# Patient Record
Sex: FEMALE | Race: WHITE | NOT HISPANIC OR LATINO | Employment: FULL TIME | URBAN - METROPOLITAN AREA
[De-identification: names, ages, dates, MRNs, and addresses within clinical notes are randomized per-mention and may not be internally consistent; named-entity substitution may affect disease eponyms.]

---

## 2019-07-16 ENCOUNTER — HOSPITAL ENCOUNTER (OUTPATIENT)
Facility: MEDICAL CENTER | Age: 39
End: 2019-07-17
Attending: EMERGENCY MEDICINE | Admitting: SURGERY
Payer: COMMERCIAL

## 2019-07-16 ENCOUNTER — APPOINTMENT (OUTPATIENT)
Dept: RADIOLOGY | Facility: MEDICAL CENTER | Age: 39
End: 2019-07-16
Attending: EMERGENCY MEDICINE
Payer: COMMERCIAL

## 2019-07-16 ENCOUNTER — ANESTHESIA (OUTPATIENT)
Dept: SURGERY | Facility: MEDICAL CENTER | Age: 39
End: 2019-07-16
Payer: COMMERCIAL

## 2019-07-16 ENCOUNTER — ANESTHESIA EVENT (OUTPATIENT)
Dept: SURGERY | Facility: MEDICAL CENTER | Age: 39
End: 2019-07-16
Payer: COMMERCIAL

## 2019-07-16 DIAGNOSIS — G89.18 POSTOPERATIVE PAIN: ICD-10-CM

## 2019-07-16 DIAGNOSIS — K80.00 ACUTE CHOLECYSTITIS DUE TO BILIARY CALCULUS: ICD-10-CM

## 2019-07-16 LAB
ALBUMIN SERPL BCP-MCNC: 4.1 G/DL (ref 3.2–4.9)
ALBUMIN/GLOB SERPL: 1.4 G/DL
ALP SERPL-CCNC: 72 U/L (ref 30–99)
ALT SERPL-CCNC: 9 U/L (ref 2–50)
ANION GAP SERPL CALC-SCNC: 8 MMOL/L (ref 0–11.9)
ANISOCYTOSIS BLD QL SMEAR: ABNORMAL
APPEARANCE UR: ABNORMAL
AST SERPL-CCNC: 10 U/L (ref 12–45)
BACTERIA #/AREA URNS HPF: ABNORMAL /HPF
BASOPHILS # BLD AUTO: 0.6 % (ref 0–1.8)
BASOPHILS # BLD: 0.05 K/UL (ref 0–0.12)
BILIRUB SERPL-MCNC: 0.4 MG/DL (ref 0.1–1.5)
BILIRUB UR QL STRIP.AUTO: NEGATIVE
BUN SERPL-MCNC: 8 MG/DL (ref 8–22)
CALCIUM SERPL-MCNC: 9 MG/DL (ref 8.5–10.5)
CHLORIDE SERPL-SCNC: 108 MMOL/L (ref 96–112)
CO2 SERPL-SCNC: 22 MMOL/L (ref 20–33)
COLOR UR: YELLOW
COMMENT 1642: NORMAL
CREAT SERPL-MCNC: 0.66 MG/DL (ref 0.5–1.4)
DACRYOCYTES BLD QL SMEAR: NORMAL
EKG IMPRESSION: NORMAL
EOSINOPHIL # BLD AUTO: 0.11 K/UL (ref 0–0.51)
EOSINOPHIL NFR BLD: 1.3 % (ref 0–6.9)
EPI CELLS #/AREA URNS HPF: ABNORMAL /HPF
ERYTHROCYTE [DISTWIDTH] IN BLOOD BY AUTOMATED COUNT: 43.3 FL (ref 35.9–50)
GLOBULIN SER CALC-MCNC: 3 G/DL (ref 1.9–3.5)
GLUCOSE SERPL-MCNC: 108 MG/DL (ref 65–99)
GLUCOSE UR STRIP.AUTO-MCNC: NEGATIVE MG/DL
HCG SERPL QL: NEGATIVE
HCT VFR BLD AUTO: 31.8 % (ref 37–47)
HGB BLD-MCNC: 8.6 G/DL (ref 12–16)
HYALINE CASTS #/AREA URNS LPF: ABNORMAL /LPF
IMM GRANULOCYTES # BLD AUTO: 0.02 K/UL (ref 0–0.11)
IMM GRANULOCYTES NFR BLD AUTO: 0.2 % (ref 0–0.9)
KETONES UR STRIP.AUTO-MCNC: NEGATIVE MG/DL
LEUKOCYTE ESTERASE UR QL STRIP.AUTO: ABNORMAL
LIPASE SERPL-CCNC: 7 U/L (ref 11–82)
LYMPHOCYTES # BLD AUTO: 1.88 K/UL (ref 1–4.8)
LYMPHOCYTES NFR BLD: 21.6 % (ref 22–41)
MCH RBC QN AUTO: 16.9 PG (ref 27–33)
MCHC RBC AUTO-ENTMCNC: 27 G/DL (ref 33.6–35)
MCV RBC AUTO: 62.5 FL (ref 81.4–97.8)
MICRO URNS: ABNORMAL
MICROCYTES BLD QL SMEAR: ABNORMAL
MONOCYTES # BLD AUTO: 0.37 K/UL (ref 0–0.85)
MONOCYTES NFR BLD AUTO: 4.3 % (ref 0–13.4)
MORPHOLOGY BLD-IMP: NORMAL
NEUTROPHILS # BLD AUTO: 6.27 K/UL (ref 2–7.15)
NEUTROPHILS NFR BLD: 72 % (ref 44–72)
NITRITE UR QL STRIP.AUTO: NEGATIVE
NRBC # BLD AUTO: 0 K/UL
NRBC BLD-RTO: 0 /100 WBC
OVALOCYTES BLD QL SMEAR: NORMAL
PATHOLOGY CONSULT NOTE: NORMAL
PH UR STRIP.AUTO: 6.5 [PH]
PLATELET # BLD AUTO: 228 K/UL (ref 164–446)
PLATELET BLD QL SMEAR: NORMAL
POIKILOCYTOSIS BLD QL SMEAR: NORMAL
POLYCHROMASIA BLD QL SMEAR: NORMAL
POTASSIUM SERPL-SCNC: 3.9 MMOL/L (ref 3.6–5.5)
PROT SERPL-MCNC: 7.1 G/DL (ref 6–8.2)
PROT UR QL STRIP: NEGATIVE MG/DL
RBC # BLD AUTO: 5.09 M/UL (ref 4.2–5.4)
RBC # URNS HPF: ABNORMAL /HPF
RBC BLD AUTO: PRESENT
RBC UR QL AUTO: NEGATIVE
SODIUM SERPL-SCNC: 138 MMOL/L (ref 135–145)
SP GR UR STRIP.AUTO: 1.02
UROBILINOGEN UR STRIP.AUTO-MCNC: 0.2 MG/DL
WBC # BLD AUTO: 8.7 K/UL (ref 4.8–10.8)
WBC #/AREA URNS HPF: ABNORMAL /HPF

## 2019-07-16 PROCEDURE — A9270 NON-COVERED ITEM OR SERVICE: HCPCS | Performed by: SURGERY

## 2019-07-16 PROCEDURE — 160036 HCHG PACU - EA ADDL 30 MINS PHASE I: Performed by: SURGERY

## 2019-07-16 PROCEDURE — 160047 HCHG PACU  - EA ADDL 30 MINS PHASE II: Performed by: SURGERY

## 2019-07-16 PROCEDURE — 502571 HCHG PACK, LAP CHOLE: Performed by: SURGERY

## 2019-07-16 PROCEDURE — 501399 HCHG SPECIMAN BAG, ENDO CATC: Performed by: SURGERY

## 2019-07-16 PROCEDURE — 501838 HCHG SUTURE GENERAL: Performed by: SURGERY

## 2019-07-16 PROCEDURE — 700111 HCHG RX REV CODE 636 W/ 250 OVERRIDE (IP): Performed by: SURGERY

## 2019-07-16 PROCEDURE — 501572 HCHG TROCAR, SHIELD OBTU 5X100: Performed by: SURGERY

## 2019-07-16 PROCEDURE — 85025 COMPLETE CBC W/AUTO DIFF WBC: CPT

## 2019-07-16 PROCEDURE — 160025 RECOVERY II MINUTES (STATS): Performed by: SURGERY

## 2019-07-16 PROCEDURE — 99291 CRITICAL CARE FIRST HOUR: CPT

## 2019-07-16 PROCEDURE — 501584 HCHG TROCAR, THRD CAN&SEAL11X100: Performed by: SURGERY

## 2019-07-16 PROCEDURE — 88304 TISSUE EXAM BY PATHOLOGIST: CPT

## 2019-07-16 PROCEDURE — 160028 HCHG SURGERY MINUTES - 1ST 30 MINS LEVEL 3: Performed by: SURGERY

## 2019-07-16 PROCEDURE — 160046 HCHG PACU - 1ST 60 MINS PHASE II: Performed by: SURGERY

## 2019-07-16 PROCEDURE — 700102 HCHG RX REV CODE 250 W/ 637 OVERRIDE(OP): Performed by: SURGERY

## 2019-07-16 PROCEDURE — 80053 COMPREHEN METABOLIC PANEL: CPT

## 2019-07-16 PROCEDURE — 700111 HCHG RX REV CODE 636 W/ 250 OVERRIDE (IP): Performed by: ANESTHESIOLOGY

## 2019-07-16 PROCEDURE — 83690 ASSAY OF LIPASE: CPT

## 2019-07-16 PROCEDURE — 700101 HCHG RX REV CODE 250: Performed by: SURGERY

## 2019-07-16 PROCEDURE — 700105 HCHG RX REV CODE 258: Performed by: SURGERY

## 2019-07-16 PROCEDURE — 81001 URINALYSIS AUTO W/SCOPE: CPT

## 2019-07-16 PROCEDURE — 501583 HCHG TROCAR, THRD CAN&SEAL 5X100: Performed by: SURGERY

## 2019-07-16 PROCEDURE — 160002 HCHG RECOVERY MINUTES (STAT): Performed by: SURGERY

## 2019-07-16 PROCEDURE — 700105 HCHG RX REV CODE 258: Performed by: EMERGENCY MEDICINE

## 2019-07-16 PROCEDURE — A9270 NON-COVERED ITEM OR SERVICE: HCPCS | Performed by: EMERGENCY MEDICINE

## 2019-07-16 PROCEDURE — 500868 HCHG NEEDLE, SURGI(VARES): Performed by: SURGERY

## 2019-07-16 PROCEDURE — 501582 HCHG TROCAR, THRD BLADED: Performed by: SURGERY

## 2019-07-16 PROCEDURE — 36415 COLL VENOUS BLD VENIPUNCTURE: CPT

## 2019-07-16 PROCEDURE — 96376 TX/PRO/DX INJ SAME DRUG ADON: CPT

## 2019-07-16 PROCEDURE — 84703 CHORIONIC GONADOTROPIN ASSAY: CPT

## 2019-07-16 PROCEDURE — 160039 HCHG SURGERY MINUTES - EA ADDL 1 MIN LEVEL 3: Performed by: SURGERY

## 2019-07-16 PROCEDURE — 160035 HCHG PACU - 1ST 60 MINS PHASE I: Performed by: SURGERY

## 2019-07-16 PROCEDURE — 96365 THER/PROPH/DIAG IV INF INIT: CPT

## 2019-07-16 PROCEDURE — 76705 ECHO EXAM OF ABDOMEN: CPT

## 2019-07-16 PROCEDURE — 700105 HCHG RX REV CODE 258: Performed by: ANESTHESIOLOGY

## 2019-07-16 PROCEDURE — A9270 NON-COVERED ITEM OR SERVICE: HCPCS | Performed by: ANESTHESIOLOGY

## 2019-07-16 PROCEDURE — 700111 HCHG RX REV CODE 636 W/ 250 OVERRIDE (IP): Performed by: EMERGENCY MEDICINE

## 2019-07-16 PROCEDURE — 700102 HCHG RX REV CODE 250 W/ 637 OVERRIDE(OP): Performed by: ANESTHESIOLOGY

## 2019-07-16 PROCEDURE — G0378 HOSPITAL OBSERVATION PER HR: HCPCS

## 2019-07-16 PROCEDURE — 96374 THER/PROPH/DIAG INJ IV PUSH: CPT

## 2019-07-16 PROCEDURE — 87086 URINE CULTURE/COLONY COUNT: CPT

## 2019-07-16 PROCEDURE — 96375 TX/PRO/DX INJ NEW DRUG ADDON: CPT

## 2019-07-16 PROCEDURE — 160048 HCHG OR STATISTICAL LEVEL 1-5: Performed by: SURGERY

## 2019-07-16 PROCEDURE — 93005 ELECTROCARDIOGRAM TRACING: CPT | Performed by: EMERGENCY MEDICINE

## 2019-07-16 PROCEDURE — 700102 HCHG RX REV CODE 250 W/ 637 OVERRIDE(OP): Performed by: EMERGENCY MEDICINE

## 2019-07-16 PROCEDURE — 502240 HCHG MISC OR SUPPLY RC 0272: Performed by: SURGERY

## 2019-07-16 PROCEDURE — 160009 HCHG ANES TIME/MIN: Performed by: SURGERY

## 2019-07-16 PROCEDURE — 700101 HCHG RX REV CODE 250: Performed by: ANESTHESIOLOGY

## 2019-07-16 PROCEDURE — A6402 STERILE GAUZE <= 16 SQ IN: HCPCS | Performed by: SURGERY

## 2019-07-16 PROCEDURE — 93005 ELECTROCARDIOGRAM TRACING: CPT

## 2019-07-16 RX ORDER — SODIUM CHLORIDE, SODIUM LACTATE, POTASSIUM CHLORIDE, CALCIUM CHLORIDE 600; 310; 30; 20 MG/100ML; MG/100ML; MG/100ML; MG/100ML
INJECTION, SOLUTION INTRAVENOUS
Status: DISCONTINUED | OUTPATIENT
Start: 2019-07-16 | End: 2019-07-16 | Stop reason: SURG

## 2019-07-16 RX ORDER — HYDROMORPHONE HYDROCHLORIDE 1 MG/ML
0.2 INJECTION, SOLUTION INTRAMUSCULAR; INTRAVENOUS; SUBCUTANEOUS
Status: DISCONTINUED | OUTPATIENT
Start: 2019-07-16 | End: 2019-07-16 | Stop reason: HOSPADM

## 2019-07-16 RX ORDER — OXYCODONE HYDROCHLORIDE AND ACETAMINOPHEN 5; 325 MG/1; MG/1
1 TABLET ORAL
Status: COMPLETED | OUTPATIENT
Start: 2019-07-16 | End: 2019-07-16

## 2019-07-16 RX ORDER — ONDANSETRON 2 MG/ML
4 INJECTION INTRAMUSCULAR; INTRAVENOUS ONCE
Status: COMPLETED | OUTPATIENT
Start: 2019-07-16 | End: 2019-07-16

## 2019-07-16 RX ORDER — HYDROMORPHONE HYDROCHLORIDE 1 MG/ML
0.1 INJECTION, SOLUTION INTRAMUSCULAR; INTRAVENOUS; SUBCUTANEOUS
Status: DISCONTINUED | OUTPATIENT
Start: 2019-07-16 | End: 2019-07-16 | Stop reason: HOSPADM

## 2019-07-16 RX ORDER — MEPERIDINE HYDROCHLORIDE 25 MG/ML
12.5 INJECTION INTRAMUSCULAR; INTRAVENOUS; SUBCUTANEOUS
Status: DISCONTINUED | OUTPATIENT
Start: 2019-07-16 | End: 2019-07-16 | Stop reason: HOSPADM

## 2019-07-16 RX ORDER — LABETALOL HYDROCHLORIDE 5 MG/ML
5 INJECTION, SOLUTION INTRAVENOUS
Status: DISCONTINUED | OUTPATIENT
Start: 2019-07-16 | End: 2019-07-16 | Stop reason: HOSPADM

## 2019-07-16 RX ORDER — HYDRALAZINE HYDROCHLORIDE 20 MG/ML
5 INJECTION INTRAMUSCULAR; INTRAVENOUS
Status: DISCONTINUED | OUTPATIENT
Start: 2019-07-16 | End: 2019-07-16 | Stop reason: HOSPADM

## 2019-07-16 RX ORDER — MORPHINE SULFATE 4 MG/ML
4 INJECTION, SOLUTION INTRAMUSCULAR; INTRAVENOUS ONCE
Status: COMPLETED | OUTPATIENT
Start: 2019-07-16 | End: 2019-07-16

## 2019-07-16 RX ORDER — DIPHENHYDRAMINE HYDROCHLORIDE 50 MG/ML
25 INJECTION INTRAMUSCULAR; INTRAVENOUS EVERY 6 HOURS PRN
Status: DISCONTINUED | OUTPATIENT
Start: 2019-07-16 | End: 2019-07-17 | Stop reason: HOSPADM

## 2019-07-16 RX ORDER — SODIUM CHLORIDE 9 MG/ML
1000 INJECTION, SOLUTION INTRAVENOUS ONCE
Status: COMPLETED | OUTPATIENT
Start: 2019-07-16 | End: 2019-07-16

## 2019-07-16 RX ORDER — SODIUM CHLORIDE, SODIUM LACTATE, POTASSIUM CHLORIDE, CALCIUM CHLORIDE 600; 310; 30; 20 MG/100ML; MG/100ML; MG/100ML; MG/100ML
INJECTION, SOLUTION INTRAVENOUS
Status: COMPLETED | OUTPATIENT
Start: 2019-07-16 | End: 2019-07-16

## 2019-07-16 RX ORDER — SODIUM CHLORIDE, SODIUM LACTATE, POTASSIUM CHLORIDE, CALCIUM CHLORIDE 600; 310; 30; 20 MG/100ML; MG/100ML; MG/100ML; MG/100ML
INJECTION, SOLUTION INTRAVENOUS CONTINUOUS
Status: DISCONTINUED | OUTPATIENT
Start: 2019-07-16 | End: 2019-07-16 | Stop reason: HOSPADM

## 2019-07-16 RX ORDER — SCOLOPAMINE TRANSDERMAL SYSTEM 1 MG/1
1 PATCH, EXTENDED RELEASE TRANSDERMAL
Status: DISCONTINUED | OUTPATIENT
Start: 2019-07-16 | End: 2019-07-17 | Stop reason: HOSPADM

## 2019-07-16 RX ORDER — HYDROMORPHONE HYDROCHLORIDE 1 MG/ML
0.4 INJECTION, SOLUTION INTRAMUSCULAR; INTRAVENOUS; SUBCUTANEOUS
Status: DISCONTINUED | OUTPATIENT
Start: 2019-07-16 | End: 2019-07-16 | Stop reason: HOSPADM

## 2019-07-16 RX ORDER — MORPHINE SULFATE 4 MG/ML
1-5 INJECTION, SOLUTION INTRAMUSCULAR; INTRAVENOUS
Status: DISCONTINUED | OUTPATIENT
Start: 2019-07-16 | End: 2019-07-17 | Stop reason: HOSPADM

## 2019-07-16 RX ORDER — ONDANSETRON 2 MG/ML
4 INJECTION INTRAMUSCULAR; INTRAVENOUS EVERY 4 HOURS PRN
Status: DISCONTINUED | OUTPATIENT
Start: 2019-07-16 | End: 2019-07-17 | Stop reason: HOSPADM

## 2019-07-16 RX ORDER — METOPROLOL TARTRATE 1 MG/ML
1 INJECTION, SOLUTION INTRAVENOUS
Status: DISCONTINUED | OUTPATIENT
Start: 2019-07-16 | End: 2019-07-16 | Stop reason: HOSPADM

## 2019-07-16 RX ORDER — HALOPERIDOL 5 MG/ML
1 INJECTION INTRAMUSCULAR
Status: DISCONTINUED | OUTPATIENT
Start: 2019-07-16 | End: 2019-07-16 | Stop reason: HOSPADM

## 2019-07-16 RX ORDER — ONDANSETRON 2 MG/ML
4 INJECTION INTRAMUSCULAR; INTRAVENOUS
Status: DISCONTINUED | OUTPATIENT
Start: 2019-07-16 | End: 2019-07-16 | Stop reason: HOSPADM

## 2019-07-16 RX ORDER — SODIUM CHLORIDE AND POTASSIUM CHLORIDE 150; 450 MG/100ML; MG/100ML
INJECTION, SOLUTION INTRAVENOUS CONTINUOUS
Status: DISCONTINUED | OUTPATIENT
Start: 2019-07-16 | End: 2019-07-17 | Stop reason: HOSPADM

## 2019-07-16 RX ORDER — CIPROFLOXACIN 2 MG/ML
INJECTION, SOLUTION INTRAVENOUS PRN
Status: DISCONTINUED | OUTPATIENT
Start: 2019-07-16 | End: 2019-07-16 | Stop reason: SURG

## 2019-07-16 RX ORDER — OXYCODONE AND ACETAMINOPHEN 10; 325 MG/1; MG/1
1 TABLET ORAL EVERY 6 HOURS PRN
Status: DISCONTINUED | OUTPATIENT
Start: 2019-07-16 | End: 2019-07-17 | Stop reason: HOSPADM

## 2019-07-16 RX ORDER — DIPHENHYDRAMINE HYDROCHLORIDE 50 MG/ML
12.5 INJECTION INTRAMUSCULAR; INTRAVENOUS
Status: DISCONTINUED | OUTPATIENT
Start: 2019-07-16 | End: 2019-07-16 | Stop reason: HOSPADM

## 2019-07-16 RX ORDER — OXYCODONE AND ACETAMINOPHEN 7.5; 325 MG/1; MG/1
1 TABLET ORAL EVERY 6 HOURS PRN
Qty: 24 TAB | Refills: 0 | Status: SHIPPED | OUTPATIENT
Start: 2019-07-16 | End: 2019-07-23

## 2019-07-16 RX ORDER — OXYCODONE HYDROCHLORIDE AND ACETAMINOPHEN 5; 325 MG/1; MG/1
2 TABLET ORAL
Status: COMPLETED | OUTPATIENT
Start: 2019-07-16 | End: 2019-07-16

## 2019-07-16 RX ORDER — CIPROFLOXACIN 2 MG/ML
400 INJECTION, SOLUTION INTRAVENOUS EVERY 12 HOURS
Status: DISCONTINUED | OUTPATIENT
Start: 2019-07-16 | End: 2019-07-17 | Stop reason: HOSPADM

## 2019-07-16 RX ORDER — ONDANSETRON 2 MG/ML
INJECTION INTRAMUSCULAR; INTRAVENOUS PRN
Status: DISCONTINUED | OUTPATIENT
Start: 2019-07-16 | End: 2019-07-16 | Stop reason: SURG

## 2019-07-16 RX ORDER — MIDAZOLAM HYDROCHLORIDE 1 MG/ML
1 INJECTION INTRAMUSCULAR; INTRAVENOUS
Status: DISCONTINUED | OUTPATIENT
Start: 2019-07-16 | End: 2019-07-16 | Stop reason: HOSPADM

## 2019-07-16 RX ORDER — BUPIVACAINE HYDROCHLORIDE AND EPINEPHRINE 5; 5 MG/ML; UG/ML
INJECTION, SOLUTION EPIDURAL; INTRACAUDAL; PERINEURAL
Status: DISCONTINUED | OUTPATIENT
Start: 2019-07-16 | End: 2019-07-16 | Stop reason: HOSPADM

## 2019-07-16 RX ORDER — ONDANSETRON 4 MG/1
4 TABLET, FILM COATED ORAL EVERY 6 HOURS PRN
Qty: 10 TAB | Refills: 0 | Status: SHIPPED | OUTPATIENT
Start: 2019-07-16 | End: 2019-07-23

## 2019-07-16 RX ADMIN — ONDANSETRON 4 MG: 2 INJECTION INTRAMUSCULAR; INTRAVENOUS at 13:16

## 2019-07-16 RX ADMIN — ONDANSETRON 4 MG: 2 INJECTION INTRAMUSCULAR; INTRAVENOUS at 11:32

## 2019-07-16 RX ADMIN — HALOPERIDOL LACTATE 1 MG: 5 INJECTION, SOLUTION INTRAMUSCULAR at 14:59

## 2019-07-16 RX ADMIN — CIPROFLOXACIN 400 MG: 2 INJECTION, SOLUTION INTRAVENOUS at 13:16

## 2019-07-16 RX ADMIN — MIDAZOLAM HYDROCHLORIDE 2 MG: 1 INJECTION, SOLUTION INTRAMUSCULAR; INTRAVENOUS at 13:16

## 2019-07-16 RX ADMIN — HYDROMORPHONE HYDROCHLORIDE 0.4 MG: 1 INJECTION, SOLUTION INTRAMUSCULAR; INTRAVENOUS; SUBCUTANEOUS at 15:06

## 2019-07-16 RX ADMIN — OXYCODONE HYDROCHLORIDE AND ACETAMINOPHEN 1 TABLET: 10; 325 TABLET ORAL at 21:12

## 2019-07-16 RX ADMIN — PROPOFOL 200 MG: 10 INJECTION, EMULSION INTRAVENOUS at 13:16

## 2019-07-16 RX ADMIN — SUCCINYLCHOLINE CHLORIDE 100 MG: 20 INJECTION, SOLUTION INTRAMUSCULAR; INTRAVENOUS at 13:16

## 2019-07-16 RX ADMIN — HYDROMORPHONE HYDROCHLORIDE 0.4 MG: 1 INJECTION, SOLUTION INTRAMUSCULAR; INTRAVENOUS; SUBCUTANEOUS at 16:54

## 2019-07-16 RX ADMIN — CIPROFLOXACIN 400 MG: 2 INJECTION, SOLUTION INTRAVENOUS at 21:13

## 2019-07-16 RX ADMIN — POTASSIUM CHLORIDE AND SODIUM CHLORIDE: 450; 150 INJECTION, SOLUTION INTRAVENOUS at 21:13

## 2019-07-16 RX ADMIN — FENTANYL CITRATE 50 MCG: 0.05 INJECTION, SOLUTION INTRAMUSCULAR; INTRAVENOUS at 14:57

## 2019-07-16 RX ADMIN — SODIUM CHLORIDE, POTASSIUM CHLORIDE, SODIUM LACTATE AND CALCIUM CHLORIDE: 600; 310; 30; 20 INJECTION, SOLUTION INTRAVENOUS at 13:16

## 2019-07-16 RX ADMIN — MIDAZOLAM HYDROCHLORIDE 1 MG: 1 INJECTION, SOLUTION INTRAMUSCULAR; INTRAVENOUS at 15:27

## 2019-07-16 RX ADMIN — SODIUM CHLORIDE, POTASSIUM CHLORIDE, SODIUM LACTATE AND CALCIUM CHLORIDE: 600; 310; 30; 20 INJECTION, SOLUTION INTRAVENOUS at 15:29

## 2019-07-16 RX ADMIN — FAMOTIDINE 20 MG: 10 INJECTION INTRAVENOUS at 10:07

## 2019-07-16 RX ADMIN — FENTANYL CITRATE 100 MCG: 50 INJECTION, SOLUTION INTRAMUSCULAR; INTRAVENOUS at 13:16

## 2019-07-16 RX ADMIN — LIDOCAINE HYDROCHLORIDE 50 MG: 20 INJECTION, SOLUTION INTRAVENOUS at 13:16

## 2019-07-16 RX ADMIN — ROCURONIUM BROMIDE 50 MG: 10 INJECTION, SOLUTION INTRAVENOUS at 13:16

## 2019-07-16 RX ADMIN — HYDROMORPHONE HYDROCHLORIDE 0.4 MG: 1 INJECTION, SOLUTION INTRAMUSCULAR; INTRAVENOUS; SUBCUTANEOUS at 15:17

## 2019-07-16 RX ADMIN — SODIUM CHLORIDE 1000 ML: 9 INJECTION, SOLUTION INTRAVENOUS at 11:33

## 2019-07-16 RX ADMIN — OXYCODONE HYDROCHLORIDE AND ACETAMINOPHEN 2 TABLET: 5; 325 TABLET ORAL at 14:55

## 2019-07-16 RX ADMIN — HYDROMORPHONE HYDROCHLORIDE 0.4 MG: 1 INJECTION, SOLUTION INTRAMUSCULAR; INTRAVENOUS; SUBCUTANEOUS at 19:25

## 2019-07-16 RX ADMIN — MORPHINE SULFATE 4 MG: 4 INJECTION INTRAVENOUS at 23:44

## 2019-07-16 RX ADMIN — MORPHINE SULFATE 4 MG: 4 INJECTION INTRAVENOUS at 11:31

## 2019-07-16 RX ADMIN — LIDOCAINE HYDROCHLORIDE 30 ML: 20 SOLUTION OROPHARYNGEAL at 10:07

## 2019-07-16 ASSESSMENT — LIFESTYLE VARIABLES
DO YOU DRINK ALCOHOL: NO
EVER_SMOKED: YES
ALCOHOL_USE: NO

## 2019-07-16 ASSESSMENT — PATIENT HEALTH QUESTIONNAIRE - PHQ9
SUM OF ALL RESPONSES TO PHQ9 QUESTIONS 1 AND 2: 0
2. FEELING DOWN, DEPRESSED, IRRITABLE, OR HOPELESS: NOT AT ALL
1. LITTLE INTEREST OR PLEASURE IN DOING THINGS: NOT AT ALL

## 2019-07-16 ASSESSMENT — ENCOUNTER SYMPTOMS
CARDIOVASCULAR NEGATIVE: 1
ABDOMINAL PAIN: 1
NAUSEA: 1
RESPIRATORY NEGATIVE: 1
VOMITING: 0

## 2019-07-16 ASSESSMENT — PAIN SCALES - GENERAL: PAIN_LEVEL: 3

## 2019-07-16 NOTE — H&P
DATE OF ADMISSION:  07/16/2019    HISTORY OF PRESENT ILLNESS:  The patient is a 39-year-old female who last week   began having epigastric and right upper quadrant pain with associated nausea,   but no vomiting.  This spontaneously resolved for about 12-15 hours and then   started again over the weekend.  Again, she tufted out and resolved except   last night, again started around 2 o'clock in the morning and became more   severe.  She is having nausea, bloating, no vomiting.  She came to the   emergency room where she was evaluated and found to have a thickened   gallbladder wall with stones and sludge and some pericholecystic fluid.  Her   white count and liver function tests were normal.  I was asked to see the   patient due to the ultrasound findings.  Patient has had several weeks of   intermittent less severe pain which she considered indigestion.    ALLERGIES:  SHE IS ALLERGIC TO NSAIDS, PENICILLIN, SOMA, AND ZOSYN.    MEDICATIONS ON ADMISSION:  None.    PAST MEDICAL HISTORY:  1.  Multiple surgeries on her back with infection for spinal stenosis.  2.  History of deep venous thrombosis in upper arm from PICC line.  3.  Hypertension.  4.  Obesity, morbid.    SOCIAL HISTORY:  Smokes about 6 cigarettes a day.  Drinks alcohol once or   twice a week.  No drug use.    REVIEW OF SYSTEMS:  The patient denies any neurological problems.  No   cardiopulmonary problems currently.  GASTROINTESTINAL:  Nausea without   vomiting for the past 3-4 days with severe right upper quadrant pain.  No   urinary tract symptoms.    FAMILY HISTORY:  Noncontributory.    PHYSICAL EXAMINATION:  GENERAL:  Patient is a morbidly obese female with a BMI of 47.37.  VITAL SIGNS:  Temperature 36.3, heart rate of 68, respirations 16, blood   pressure 134/83.  HEAD AND NECK:  Pupils equal, round, reactive to light.  Extraocular movements   are intact.  No scleral icterus.  No cervical adenopathy.  LUNGS:  Clear bilaterally without wheezes, rales or  rhonchi.  Normal chest   wall expansion.  HEART:  Regular rate and rhythm without murmurs, rubs or gallops.  No carotid   bruits.  No jugular venous distention.  No peripheral edema.  ABDOMEN:  Soft, but tender, especially in the epigastric and right upper   quadrant.  No palpable masses.  Positive Galeano's sign, hypoactive, but   present bowel sounds.  MUSCULOSKELETAL:  Moves all 4 extremities in normal range of motion.  Strength   is grossly normal.  NEUROLOGICAL:  Cranial nerves II-XII are grossly intact.  Sensation is grossly   normal.  PSYCHIATRIC:  Alert and oriented x3.  Mood and affect are appropriate.    LABORATORY DATA:  Show a white count of 8.7, hemoglobin and hematocrit of 8.6   and 37.8 with 288,000 platelets, no left shift.  Lipase of 7, sodium 138,   potassium 3.9, chloride 108, bicarbonate 22, glucose 108, BUN of 8, creatinine   0.66, AST of 10, ALT of 9, alkaline phosphatase is 72, total bilirubin 0.4.    Urine is cloudy with a positive leukocyte esterase and many bacteria.    IMPRESSION:  1.  Acute cholecystitis.  2.  Morbid obesity.  3.  Anemia.  4.  Possible urinary tract infection versus contamination on collection.    PLAN:  Patient is scheduled to be taken to the operating room for a   laparoscopic cholecystectomy.  I have explained the risks and benefits to her   including bleeding, infection, injury to the common bile duct, bowel   perforation and postoperative bile leak, and postoperative   post-cholecystectomy syndrome resulting in severe diarrhea.  She understands   these risks and agrees to proceed.       ____________________________________     Mansoor Campa MD    PMS / NTS    DD:  07/16/2019 12:14:35  DT:  07/16/2019 12:30:42    D#:  [id number]  Job#:  [id number]    cc: ROSALIO GAN MD

## 2019-07-16 NOTE — OR SURGEON
Immediate Post OP Note    PreOp Diagnosis: Acute Cholecystitis    PostOp Diagnosis: Same    Procedure(s):  CHOLECYSTECTOMY, LAPAROSCOPIC - Wound Class: Contaminated    Surgeon(s):  Mansoor Campa M.D.    Anesthesiologist/Type of Anesthesia: GET  Anesthesiologist: Hipolito Castellon M.D./General    Surgical Staff:  Circulator: Dilia Ramirez R.N.  Scrub Person: Vince Yadav; Sonia Castillo    Specimens removed if any:  ID Type Source Tests Collected by Time Destination   A : GALLBLADDER Tissue Gallbladder PATHOLOGY SPECIMEN Mansoor Campa M.D. 7/16/2019  1:00 PM        Estimated Blood Loss: < 15 cc    Findings: acute cholecystitis    Complications: none        7/16/2019 2:39 PM Mansoor Campa M.D.

## 2019-07-16 NOTE — OR NURSING
Patient awake and very anxious.  C/o severe pain to upper abd.  Dressings x4 clean and dry. Ice pack to sight.  VSS.  Elevated head of bed and given emotional support.  Plan for patient to possibly discharge home today if meets criteria.  Patient states she wants to try to go home if pain is controlled..  Family updated with patient plan

## 2019-07-16 NOTE — ANESTHESIA PROCEDURE NOTES
Airway  Date/Time: 7/16/2019 1:16 PM  Performed by: FROY BRIZUELA  Authorized by: FROY BRIZUELA     Location:  OR  Urgency:  Elective  Indications for Airway Management:  Anesthesia  Spontaneous Ventilation: absent    Sedation Level:  Deep  Preoxygenated: Yes    Patient Position:  Sniffing  Final Airway Type:  Endotracheal airway  Final Endotracheal Airway:  ETT  Cuffed: Yes    Technique Used for Successful ETT Placement:  Direct laryngoscopy  Insertion Site:  Oral  Blade Type:  Jessica  Laryngoscope Blade/Videolaryngoscope Blade Size:  4  ETT Size (mm):  7.0  Measured from:  Teeth  ETT to Teeth (cm):  22  Placement Verified by: auscultation and capnometry    Cormack-Lehane Classification:  Grade I - full view of glottis  Number of Attempts at Approach:  1

## 2019-07-16 NOTE — ED TRIAGE NOTES
40 y/o female ambulatory to triage with c/o epigastric and RUQ abd pain that radiates around to her back. Pt states the pain occurred first a few days ago then came back again over the weekend and again this morning. Pt states she tried to take Tums for the pain but they did not help.

## 2019-07-16 NOTE — ANESTHESIA POSTPROCEDURE EVALUATION
Patient: Jessica Francois    Procedure Summary     Date:  07/16/19 Room / Location:  [facility] 04 / SURGERY [facility]    Anesthesia Start:  1316 Anesthesia Stop:      Procedure:  CHOLECYSTECTOMY, LAPAROSCOPIC (Abdomen) Diagnosis:  (Acute cholecystitis)    Surgeon:  Mansoor Campa M.D. Responsible Provider:  Hipolito Castellon M.D.    Anesthesia Type:  general ASA Status:  3          Final Anesthesia Type: general  Last vitals  BP   Blood Pressure: 123/63, NIBP: 123/68    Temp   36.2 °C (97.1 °F)    Pulse   Pulse: 80, Heart Rate (Monitored): (!) 51   Resp   17    SpO2   98 %      Anesthesia Post Evaluation    Patient location during evaluation: PACU  Patient participation: complete - patient participated  Level of consciousness: sleepy but conscious  Pain score: 3    Airway patency: patent  Anesthetic complications: no  Cardiovascular status: hemodynamically stable  Respiratory status: acceptable  Hydration status: euvolemic    PONV: none           Nurse Pain Score: 8 (NPRS)

## 2019-07-16 NOTE — DISCHARGE INSTRUCTIONS
ACTIVITY: Rest and take it easy for the first 24 hours.  A responsible adult is recommended to remain with you during that time.  It is normal to feel sleepy.  We encourage you to not do anything that requires balance, judgment or coordination.    MILD FLU-LIKE SYMPTOMS ARE NORMAL. YOU MAY EXPERIENCE GENERALIZED MUSCLE ACHES, THROAT IRRITATION, HEADACHE AND/OR SOME NAUSEA.    FOR 24 HOURS DO NOT:  Drive, operate machinery or run household appliances.  Drink beer or alcoholic beverages.   Make important decisions or sign legal documents.    SPECIAL INSTRUCTIONS: No lifting greater than 10 lbs for 4 weeks.  Clear liquid diet tonight and advance diet to regular diet as tolerated.  No driving for one week or while on pain medications.    DIET: To avoid nausea, slowly advance diet as tolerated, avoiding spicy or greasy foods for the first day.  Add more substantial food to your diet according to your physician's instructions. INCREASE FLUIDS AND FIBER TO AVOID CONSTIPATION.    SURGICAL DRESSING/BATHING: Showers only for 2 weeks. Remove dressings in 4 days.    FOLLOW-UP APPOINTMENT:  A follow-up appointment should be arranged with your doctor in 7-10 days; call to schedule.    You should CALL YOUR PHYSICIAN if you develop:  Fever greater than 101 degrees F.  Pain not relieved by medication, or persistent nausea or vomiting.  Excessive bleeding (blood soaking through dressing) or unexpected drainage from the wound.  Extreme redness or swelling around the incision site, drainage of pus or foul smelling drainage.  Inability to urinate or empty your bladder within 8 hours.  Problems with breathing or chest pain.    You should call 911 if you develop problems with breathing or chest pain.  If you are unable to contact your doctor or surgical center, you should go to the nearest emergency room or urgent care center.  Physician's telephone #: Dr. Campa [phone].    If any questions arise, call your doctor.  If your doctor  is not available, please feel free to call the Surgical Center at [phone].  The Center is open Monday through Friday from 7AM to 7PM.  You can also call the HEALTH HOTLINE open 24 hours/day, 7 days/week and speak to a nurse at [phone], or toll free at [phone].    A registered nurse may call you a few days after your surgery to see how you are doing after your procedure.    MEDICATIONS: Resume taking daily medication.  Take prescribed pain medication with food.  If no medication is prescribed, you may take non-aspirin pain medication if needed.  PAIN MEDICATION CAN BE VERY CONSTIPATING.  Take a stool softener or laxative such as senokot, pericolace, or milk of magnesia if needed.    Prescription given for Zofran (nausea medication) Percocet (pain medication).  Last pain medication given at ***.    If your physician has prescribed pain medication that includes Acetaminophen (Tylenol), do not take additional Acetaminophen (Tylenol) while taking the prescribed medication.    Depression / Suicide Risk    As you are discharged from this [facility] facility, it is important to learn how to keep safe from harming yourself.    Recognize the warning signs:  · Abrupt changes in personality, positive or negative- including increase in energy   · Giving away possessions  · Change in eating patterns- significant weight changes-  positive or negative  · Change in sleeping patterns- unable to sleep or sleeping all the time   · Unwillingness or inability to communicate  · Depression  · Unusual sadness, discouragement and loneliness  · Talk of wanting to die  · Neglect of personal appearance   · Rebelliousness- reckless behavior  · Withdrawal from people/activities they love  · Confusion- inability to concentrate     If you or a loved one observes any of these behaviors or has concerns about self-harm, here's what you can do:  · Talk about it- your feelings and reasons for harming yourself  · Remove any means  that you might use to hurt yourself (examples: pills, rope, extension cords, firearm)  · Get professional help from the community (Mental Health, Substance Abuse, psychological counseling)  · Do not be alone:Call your Safe Contact- someone whom you trust who will be there for you.  · Call your local CRISIS HOTLINE 547-7785 or 549-748-7774  · Call your local Children's Mobile Crisis Response Team [place] (241) 100-5374 or www.[employer]  · Call the toll free National Suicide Prevention Hotlines   · National Suicide Prevention Lifeline 292-607-PASB (0460)  · National Hope Line Network 800-SUICIDE (884-9004)

## 2019-07-16 NOTE — ED NOTES
Med Rec completed per patient   Allergies reviewed  No ORAL antibiotics in last 14 days    Patient stated she takes no medications

## 2019-07-16 NOTE — OR NURSING
Patient arrived in discharge stating that she does not want to go home and thinks that she needs to stay. Does not want to get up and into the chair, patient encouraged to go home and we will wait and see how she does.  notified and will come here.    1635:  and friend at bedside.  1700: Spoke with dr. justice and he will put in observation orders as patient wants to stay overnight, Pain is 9/10 and cannot keep her oxygen saturation above 91%. Once dr. justice agreed to admit her for observation, oxygen placed, 2L and patient medicated per the MAR.    1955: Gave report to RN on CDU, all belongings with patient.

## 2019-07-16 NOTE — ED PROVIDER NOTES
ED Provider Note    Scribed for Jai Borrego D.O. by Que Garcia. 7/16/2019  9:23 AM    Primary care provider: Caprice White M.D.  Means of arrival: walk in  History obtained from: patient  History limited by: none    CHIEF COMPLAINT  Chief Complaint   Patient presents with   • RUQ Pain   • Abdominal Pain   • Epigastric Pain       HPI  Jessica Francois is a 39 y.o. female who presents to the Emergency Department complaining of intermittent and worsening epigastric abdominal pain for the last 3 days. She describes her pain as intermittent with sudden onset, that is localized to her epigastrium, radiates into her back and was not improved with antacid administration or positional changes. Patient reports associated nausea. She states that she has had intermittent pain for the last 3 days that comes and goes with no known precipitating factors. Patient reports an increase in severity of her normal menstrual cramping over the last 2 menstrual cycles. She presents to the ED for evaluation of this persistent abdominal pain. She reports a history of spinal fusion. Patient denies vomiting, dysuria, hematuria, daily alcohol use.      REVIEW OF SYSTEMS  Pertinent positives include abdominal pain, nausea. Pertinent negatives include no vomiting, dysuria, hematuria.  All other systems reviewed and negative.    PAST MEDICAL HISTORY  Past Medical History:   Diagnosis Date   • Pain     chronic back pain       SURGICAL HISTORY  Past Surgical History:   Procedure Laterality Date   • LUMBAR FUSION POSTERIOR  7/15/2013    Performed by Edwin Pichardo M.D. at SURGERY [facility] ORS   • LUMBAR DECOMPRESSION  7/15/2013    Performed by Edwin Pichardo M.D. at SURGERY [facility] ORS   • DILATION AND EVACUATION  6/8/2012    Performed by DAVID PABON at SURGERY SAME DAY [facility]   • LUMBAR LAMINECTOMY DISKECTOMY  10/23/2009    Performed by KRISTYN BERNARD at SURGERY [facility] ORS   • OTHER  1995  "   wisdom teeth removed.   • OTHER  1985    tonsillectomy   • OTHER ORTHOPEDIC SURGERY  2009/1997    micro discectomy   • TONSILLECTOMY          SOCIAL HISTORY  Social History   Substance Use Topics   • Smoking status: Current Every Day Smoker     Packs/day: 0.20     Years: 12.00     Types: Cigarettes   • Smokeless tobacco: Never Used      Comment: 1 ppd, 10 yrs   • Alcohol use Yes      Comment:  0-1 per week      History   Drug Use No       FAMILY HISTORY  None noted    CURRENT MEDICATIONS  Home Medications     Reviewed by Joni Goldstein R.N. (Registered Nurse) on 07/16/19 at 0903  Med List Status: Complete   Medication Last Dose Status        Patient Rafael Taking any Medications                       ALLERGIES  Allergies   Allergen Reactions   • Nsaids Rash and Vomiting   • Pcn [Penicillins] Rash     Head to toe   • Soma [Carisoprodol] Vomiting   • Zosyn Rash       PHYSICAL EXAM  VITAL SIGNS: /83   Pulse 85   Temp 36.3 °C (97.4 °F) (Temporal)   Resp 18   Ht 1.753 m (5' 9\")   Wt (!) 145.5 kg (320 lb 12.3 oz)   LMP 07/03/2019   SpO2 100%   BMI 47.37 kg/m²     Nursing notes and vitals reviewed.  Constitutional: Well developed, Well nourished, No acute distress, Non-toxic appearance.   Eyes: PERRLA, EOMI, Conjunctiva normal, No discharge.   Cardiovascular: Normal heart rate, Normal rhythm, No murmurs, No rubs, No gallops.   Thorax & Lungs: No respiratory distress, No rales, No rhonchi, No wheezing, No chest tenderness.   Abdomen: Bowel sounds normal, Soft, Epigastric and right upper quadrant abdominal tenderness, No guarding, No rebound, No masses, No pulsatile masses.   Skin: Warm, Dry, No erythema, No rash.   Musculoskeletal: Intact distal pulses, No edema, No cyanosis, No clubbing. Good range of motion in all major joints. No tenderness to palpation or major deformities noted, no CVA tenderness, no midline back tenderness.   Neurologic: Alert & oriented x 3, Normal motor function, Normal sensory " function, No focal deficits noted.  Psychiatric: Affect normal for clinical presentation.    DIAGNOSTIC STUDIES/PROCEDURES    LABS  Results for orders placed or performed during the hospital encounter of 07/16/19   CBC WITH DIFFERENTIAL   Result Value Ref Range    WBC 8.7 4.8 - 10.8 K/uL    RBC 5.09 4.20 - 5.40 M/uL    Hemoglobin 8.6 (L) 12.0 - 16.0 g/dL    Hematocrit 31.8 (L) 37.0 - 47.0 %    MCV 62.5 (L) 81.4 - 97.8 fL    MCH 16.9 (L) 27.0 - 33.0 pg    MCHC 27.0 (L) 33.6 - 35.0 g/dL    RDW 43.3 35.9 - 50.0 fL    Platelet Count 228 164 - 446 K/uL    Neutrophils-Polys 72.00 44.00 - 72.00 %    Lymphocytes 21.60 (L) 22.00 - 41.00 %    Monocytes 4.30 0.00 - 13.40 %    Eosinophils 1.30 0.00 - 6.90 %    Basophils 0.60 0.00 - 1.80 %    Immature Granulocytes 0.20 0.00 - 0.90 %    Nucleated RBC 0.00 /100 WBC    Neutrophils (Absolute) 6.27 2.00 - 7.15 K/uL    Lymphs (Absolute) 1.88 1.00 - 4.80 K/uL    Monos (Absolute) 0.37 0.00 - 0.85 K/uL    Eos (Absolute) 0.11 0.00 - 0.51 K/uL    Baso (Absolute) 0.05 0.00 - 0.12 K/uL    Immature Granulocytes (abs) 0.02 0.00 - 0.11 K/uL    NRBC (Absolute) 0.00 K/uL    Anisocytosis 2+     Microcytosis 2+    COMP METABOLIC PANEL   Result Value Ref Range    Sodium 138 135 - 145 mmol/L    Potassium 3.9 3.6 - 5.5 mmol/L    Chloride 108 96 - 112 mmol/L    Co2 22 20 - 33 mmol/L    Anion Gap 8.0 0.0 - 11.9    Glucose 108 (H) 65 - 99 mg/dL    Bun 8 8 - 22 mg/dL    Creatinine 0.66 0.50 - 1.40 mg/dL    Calcium 9.0 8.5 - 10.5 mg/dL    AST(SGOT) 10 (L) 12 - 45 U/L    ALT(SGPT) 9 2 - 50 U/L    Alkaline Phosphatase 72 30 - 99 U/L    Total Bilirubin 0.4 0.1 - 1.5 mg/dL    Albumin 4.1 3.2 - 4.9 g/dL    Total Protein 7.1 6.0 - 8.2 g/dL    Globulin 3.0 1.9 - 3.5 g/dL    A-G Ratio 1.4 g/dL   LIPASE   Result Value Ref Range    Lipase 7 (L) 11 - 82 U/L   URINALYSIS CULTURE, IF INDICATED   Result Value Ref Range    Color Yellow     Character Cloudy (A)     Specific Gravity 1.018 <1.035    Ph 6.5 5.0 - 8.0     Glucose Negative Negative mg/dL    Ketones Negative Negative mg/dL    Protein Negative Negative mg/dL    Bilirubin Negative Negative    Urobilinogen, Urine 0.2 Negative    Nitrite Negative Negative    Leukocyte Esterase Moderate (A) Negative    Occult Blood Negative Negative    Micro Urine Req Microscopic    BETA-HCG QUALITATIVE SERUM   Result Value Ref Range    Beta-Hcg Qualitative Serum Negative Negative   URINE MICROSCOPIC (W/UA)   Result Value Ref Range    WBC 10-20 (A) /hpf    RBC Rare /hpf    Bacteria Many (A) None /hpf    Epithelial Cells Moderate (A) /hpf    Hyaline Cast 0-2 /lpf   ESTIMATED GFR   Result Value Ref Range    GFR If African American >60 >60 mL/min/1.73 m 2    GFR If Non African American >60 >60 mL/min/1.73 m 2   PERIPHERAL SMEAR REVIEW   Result Value Ref Range    Peripheral Smear Review see below    PLATELET ESTIMATE   Result Value Ref Range    Plt Estimation Normal    MORPHOLOGY   Result Value Ref Range    RBC Morphology Present     Polychromia 1+     Poikilocytosis 2+     Ovalocytes 1+     Tear Drop Cells 1+    DIFFERENTIAL COMMENT   Result Value Ref Range    Comments-Diff see below    EKG   Result Value Ref Range    Report       [facility] Emergency Dept.    Test Date:  2019  Pt Name:    KARI BARRERA               Department: ER  MRN:        [id number]                      Room:  Gender:     Female                       Technician: [id number]  :        1980                   Requested By:ER TRIAGE PROTOCOL  Order #:    [id number]                    Kirk MD: SYBIL RING, DO    Measurements  Intervals                                Axis  Rate:       82                           P:          0  KS:         144                          QRS:        -18  QRSD:       96                           T:          7  QT:         408  QTc:        477    Interpretive Statements  SINUS RHYTHM  BORDERLINE LEFT AXIS DEVIATION  Compared to ECG 2013 15:19:26  No  significant changes    Electronically Signed On 7- 9:43:01 PDT by SYBIL RING, DO         All labs reviewed by me.    RADIOLOGY  US-RUQ   Final Result      Gallbladder sludge, gallstones with gallbladder wall thickening and trace pericholecystic fluid. Findings can be seen in cholecystitis in the correct clinical setting.      Limited evaluation of the common duct which appears dilated.      Enlarged and mildly echogenic appearance of the liver can be seen in hepatic steatosis.      Limited evaluation of the aorta and pancreatic tail.         The radiologist's interpretation of all radiological studies have been reviewed by me.    COURSE & MEDICAL DECISION MAKING  Pertinent Labs & Imaging studies reviewed. (See chart for details)    9:23 AM - Patient seen and examined at bedside. Discussed her evaluation in the ED. Patient verbalizes understanding and agreement to this plan of care. Patient will be treated with GI cocktail 30 ml, Pepcid 20 mg. Ordered US RUQ, Beta HCG qual serum, CBC with differential, CMP, Lipase, Urinalysis culture to evaluate her symptoms.     11:12 AM - Patient reevaluated at bedside. Discussed her US results. Her last PO intake was 8 pm last night. Plan of care discussed. I will consult with general surgeon. Elisa general surgery.     11:25 AM - I discussed the patient's case and the above findings with Dr. Campa (surgery) who agrees to see the patient in the emergency department.    This is a charming 39 y.o. female that presents with acute cholecystitis.  The patient does not have a leukocytosis, she is afebrile therefore in a boxer with hello this point time.  She does have evidence of a contaminated urine but she denies any type of urinary abnormality such as increased frequency, dysuria therefore we will wait for the culture prior to antibiotics.  Patient's received GI cocktail and Pepcid without relief, ultrasound did reveal evidence of probably early cholecystitis with  gallbladder sludge, stones, thickened gallbladder wall, pericholecystic fluid as well as a common bile duct of 1.01 cm.  The patient has no evidence of elevation of her total bilirubin this point.  Following this, 1 L normal saline fluid boluses been infused secondary to her n.p.o. status, she was received morphine 4 mg as well as Zofran 4 mill grams IV.        FINAL IMPRESSION  Acute cholecystitis  Biliary colic     IQeu (Diyaibherb), am scribing for, and in the presence of, Jai Borrego D.O    Electronically signed by: Que Garcia (Devaughn), 7/16/2019    IJai D.O. personally performed the services described in this documentation, as scribed by Que Garcia in my presence, and it is both accurate and complete. C    The note accurately reflects work and decisions made by me.  Jai Borrego  7/16/2019  11:33 AM

## 2019-07-16 NOTE — PROGRESS NOTES
Surgical Progress Note    Author: Mansoor Campa Date & Time created: 2019   1:05 PM     Interval Events:    Review of Systems   Respiratory: Negative.    Cardiovascular: Negative.    Gastrointestinal: Positive for abdominal pain and nausea. Negative for vomiting.     Hemodynamics:  Temp (24hrs), Av.3 °C (97.4 °F), Min:36.3 °C (97.4 °F), Max:36.3 °C (97.4 °F)  Temperature: 36.3 °C (97.4 °F)  Pulse  Av.7  Min: 51  Max: 85Heart Rate (Monitored): (!) 51  Blood Pressure: 134/83, NIBP: 123/68     Respiratory:    Respiration: 17, Pulse Oximetry: 100 %           Neuro:  GCS       Fluids:  No intake or output data in the 24 hours ending 19 1305  Weight: (!) 145.5 kg (320 lb 12.3 oz)  Current Diet Order   Procedures   • Diet NPO     Physical Exam   Cardiovascular: Normal rate.    Abdominal: There is tenderness.     Labs:  Recent Results (from the past 24 hour(s))   EKG    Collection Time: 19  8:51 AM   Result Value Ref Range    Report       [facility].    Test Date:  2019  Pt Name:    KARI BARRERA               Department: ER  MRN:        [id number]                      Room:  Gender:     Female                       Technician: [id number]  :        [id number]                   Requested By:ER TRIAGE PROTOCOL  Order #:    [id number]                    Reading MD: SYBIL RING DO    Measurements  Intervals                                Axis  Rate:       82                           P:          0  MI:         144                          QRS:        -18  QRSD:       96                           T:          7  QT:         408  QTc:        477    Interpretive Statements  SINUS RHYTHM  BORDERLINE LEFT AXIS DEVIATION  Compared to ECG 2013 15:19:26  No significant changes    Electronically Signed On 2019 9:43:01 PDT by SYBIL RING DO     URINALYSIS CULTURE, IF INDICATED    Collection Time: 19  9:21 AM   Result Value Ref Range    Color  Yellow     Character Cloudy (A)     Specific Gravity 1.018 <1.035    Ph 6.5 5.0 - 8.0    Glucose Negative Negative mg/dL    Ketones Negative Negative mg/dL    Protein Negative Negative mg/dL    Bilirubin Negative Negative    Urobilinogen, Urine 0.2 Negative    Nitrite Negative Negative    Leukocyte Esterase Moderate (A) Negative    Occult Blood Negative Negative    Micro Urine Req Microscopic    URINE MICROSCOPIC (W/UA)    Collection Time: 07/16/19  9:21 AM   Result Value Ref Range    WBC 10-20 (A) /hpf    RBC Rare /hpf    Bacteria Many (A) None /hpf    Epithelial Cells Moderate (A) /hpf    Hyaline Cast 0-2 /lpf   CBC WITH DIFFERENTIAL    Collection Time: 07/16/19  9:49 AM   Result Value Ref Range    WBC 8.7 4.8 - 10.8 K/uL    RBC 5.09 4.20 - 5.40 M/uL    Hemoglobin 8.6 (L) 12.0 - 16.0 g/dL    Hematocrit 31.8 (L) 37.0 - 47.0 %    MCV 62.5 (L) 81.4 - 97.8 fL    MCH 16.9 (L) 27.0 - 33.0 pg    MCHC 27.0 (L) 33.6 - 35.0 g/dL    RDW 43.3 35.9 - 50.0 fL    Platelet Count 228 164 - 446 K/uL    Neutrophils-Polys 72.00 44.00 - 72.00 %    Lymphocytes 21.60 (L) 22.00 - 41.00 %    Monocytes 4.30 0.00 - 13.40 %    Eosinophils 1.30 0.00 - 6.90 %    Basophils 0.60 0.00 - 1.80 %    Immature Granulocytes 0.20 0.00 - 0.90 %    Nucleated RBC 0.00 /100 WBC    Neutrophils (Absolute) 6.27 2.00 - 7.15 K/uL    Lymphs (Absolute) 1.88 1.00 - 4.80 K/uL    Monos (Absolute) 0.37 0.00 - 0.85 K/uL    Eos (Absolute) 0.11 0.00 - 0.51 K/uL    Baso (Absolute) 0.05 0.00 - 0.12 K/uL    Immature Granulocytes (abs) 0.02 0.00 - 0.11 K/uL    NRBC (Absolute) 0.00 K/uL    Anisocytosis 2+     Microcytosis 2+    COMP METABOLIC PANEL    Collection Time: 07/16/19  9:49 AM   Result Value Ref Range    Sodium 138 135 - 145 mmol/L    Potassium 3.9 3.6 - 5.5 mmol/L    Chloride 108 96 - 112 mmol/L    Co2 22 20 - 33 mmol/L    Anion Gap 8.0 0.0 - 11.9    Glucose 108 (H) 65 - 99 mg/dL    Bun 8 8 - 22 mg/dL    Creatinine 0.66 0.50 - 1.40 mg/dL    Calcium 9.0 8.5 - 10.5  mg/dL    AST(SGOT) 10 (L) 12 - 45 U/L    ALT(SGPT) 9 2 - 50 U/L    Alkaline Phosphatase 72 30 - 99 U/L    Total Bilirubin 0.4 0.1 - 1.5 mg/dL    Albumin 4.1 3.2 - 4.9 g/dL    Total Protein 7.1 6.0 - 8.2 g/dL    Globulin 3.0 1.9 - 3.5 g/dL    A-G Ratio 1.4 g/dL   LIPASE    Collection Time: 07/16/19  9:49 AM   Result Value Ref Range    Lipase 7 (L) 11 - 82 U/L   BETA-HCG QUALITATIVE SERUM    Collection Time: 07/16/19  9:49 AM   Result Value Ref Range    Beta-Hcg Qualitative Serum Negative Negative   ESTIMATED GFR    Collection Time: 07/16/19  9:49 AM   Result Value Ref Range    GFR If African American >60 >60 mL/min/1.73 m 2    GFR If Non African American >60 >60 mL/min/1.73 m 2   PERIPHERAL SMEAR REVIEW    Collection Time: 07/16/19  9:49 AM   Result Value Ref Range    Peripheral Smear Review see below    PLATELET ESTIMATE    Collection Time: 07/16/19  9:49 AM   Result Value Ref Range    Plt Estimation Normal    MORPHOLOGY    Collection Time: 07/16/19  9:49 AM   Result Value Ref Range    RBC Morphology Present     Polychromia 1+     Poikilocytosis 2+     Ovalocytes 1+     Tear Drop Cells 1+    DIFFERENTIAL COMMENT    Collection Time: 07/16/19  9:49 AM   Result Value Ref Range    Comments-Diff see below      Medical Decision Making, by Problem:  There are no active hospital problems to display for this patient.    Plan:  For OR for Lap Shara for acute cholecystitis    Quality Measures:  Quality-Core Measures    Discussed patient condition with Patient

## 2019-07-16 NOTE — ANESTHESIA TIME REPORT
Anesthesia Start and Stop Event Times     Date Time Event    7/16/2019 1316 Anesthesia Start     1439 Anesthesia Stop        Responsible Staff  07/16/19    Name Role Begin End    Hipolito Castellon M.D. Anesth 1316 1439        Preop Diagnosis (Free Text):  Pre-op Diagnosis     Acute cholecystitis        Preop Diagnosis (Codes):  Diagnosis Information     Diagnosis Code(s):         Post op Diagnosis  Acute cholecystitis      Premium Reason  Non-Premium    Comments:

## 2019-07-17 VITALS
TEMPERATURE: 99.2 F | HEART RATE: 84 BPM | HEIGHT: 69 IN | SYSTOLIC BLOOD PRESSURE: 133 MMHG | WEIGHT: 293 LBS | BODY MASS INDEX: 43.4 KG/M2 | RESPIRATION RATE: 18 BRPM | OXYGEN SATURATION: 95 % | DIASTOLIC BLOOD PRESSURE: 66 MMHG

## 2019-07-17 PROCEDURE — G0378 HOSPITAL OBSERVATION PER HR: HCPCS

## 2019-07-17 PROCEDURE — 96376 TX/PRO/DX INJ SAME DRUG ADON: CPT

## 2019-07-17 PROCEDURE — A9270 NON-COVERED ITEM OR SERVICE: HCPCS | Performed by: SURGERY

## 2019-07-17 PROCEDURE — 700111 HCHG RX REV CODE 636 W/ 250 OVERRIDE (IP): Performed by: SURGERY

## 2019-07-17 PROCEDURE — 700102 HCHG RX REV CODE 250 W/ 637 OVERRIDE(OP): Performed by: SURGERY

## 2019-07-17 PROCEDURE — 96366 THER/PROPH/DIAG IV INF ADDON: CPT

## 2019-07-17 RX ADMIN — OXYCODONE HYDROCHLORIDE AND ACETAMINOPHEN 1 TABLET: 10; 325 TABLET ORAL at 05:14

## 2019-07-17 RX ADMIN — CIPROFLOXACIN 400 MG: 2 INJECTION, SOLUTION INTRAVENOUS at 05:15

## 2019-07-17 RX ADMIN — MORPHINE SULFATE 4 MG: 4 INJECTION INTRAVENOUS at 03:46

## 2019-07-17 ASSESSMENT — PATIENT HEALTH QUESTIONNAIRE - PHQ9
2. FEELING DOWN, DEPRESSED, IRRITABLE, OR HOPELESS: NOT AT ALL
SUM OF ALL RESPONSES TO PHQ9 QUESTIONS 1 AND 2: 0
1. LITTLE INTEREST OR PLEASURE IN DOING THINGS: NOT AT ALL
2. FEELING DOWN, DEPRESSED, IRRITABLE, OR HOPELESS: NOT AT ALL
SUM OF ALL RESPONSES TO PHQ9 QUESTIONS 1 AND 2: 0
1. LITTLE INTEREST OR PLEASURE IN DOING THINGS: NOT AT ALL

## 2019-07-17 NOTE — OP REPORT
DATE OF SERVICE:  07/16/2019    PREOPERATIVE DIAGNOSIS:  Acute cholecystitis.    POSTOPERATIVE DIAGNOSIS:  Acute cholecystitis with cholelithiasis.    PROCEDURE:  Laparoscopic cholecystectomy.    ANESTHESIA:  General endotracheal.    ANESTHESIOLOGIST:  Hipolito Castellon MD    SURGEON:  Mansoor Campa MD    INDICATIONS:  A patient with evidence of acute cholecystitis on ultrasound.    OPERATIVE FINDINGS:  Distended, thickened gallbladder with edema.    OPERATIVE NOTE:  Patient was taken to the operating room, placed in supine   position, given general endotracheal anesthesia.  Once properly anesthetized,   she was prepped and draped in the usual sterile fashion.  Supraumbilical   incision was made transversely after the skin had been anesthetized with 0.5%   Marcaine with epinephrine.  The towel clamp was used to grab the umbilical   stalk and tenting the abdominal wall upward.  A Veress needle was inserted   without resistance.  Aspiration and flushing revealed no abnormality.    Pneumoperitoneum was obtained, and then, the 11 mm trocar port was placed.    Under direct vision, the camera was inserted and the other 3 ports were   inserted after the skin had been anesthetized with 0.5% Marcaine with   epinephrine.  The gallbladder was edematous and distended and was decompressed   with an aspiration trocar allowing us to then the grab the gallbladder and   tent it up over the liver at the fundus and infundibulum.  Peritoneal   reflection was stripped down to expose the cystic duct and artery.  These were   clearly identified and clipped proximally and distally with proximal end x3   on the duct and x2 on the artery.  The gallbladder was brought out of the   liver bed with hook electrocautery and removed through an EndoCatch bag   through the epigastric port site, which had to be lengthened due to the large   stone that was in the gallbladder.  The liver bed was inspected and there was   no active bleeding from the liver  bed, duct or artery.  The abdomen was   irrigated out with saline until clear.  An anterior closure was done   interrupted in 4 places along the fascia.  The umbilical port site was closed   with a figure-of-eight 0 Vicryl suture.  All skin incisions were closed with   4-0 Vicryl subcuticular closures and Tegaderm dressings were used.       ____________________________________     MD ALVA Coleman / JAVIER    DD:  07/16/2019 18:10:57  DT:  07/16/2019 18:50:51    D#:  [id number]  Job#:  [id number]

## 2019-07-17 NOTE — PROGRESS NOTES
Assessment done, Pt educated on plan of care. Waiting on dr rounds  Patient resting in bed, no signs of distress,  no complaints of pain at this time. Call light within reach,  side rails up, will monitor. Condition stable incentive spirometer given to patient instructed the use

## 2019-07-17 NOTE — DISCHARGE SUMMARY
DATE OF ADMISSION:  07/16/2019    DATE OF DISCHARGE:  07/17/2019    FINAL DISCHARGE DIAGNOSIS:  Acute cholecystitis.    OPERATION PERFORMED:  Laparoscopic cholecystectomy.    HOSPITAL COURSE:  The patient was admitted on the 16th with evidence of acute   cholecystitis, underwent a laparoscopic cholecystectomy that afternoon, was   thought initially to be able to go home that evening; however, she was   complaining of pain, nausea, and also requiring oxygen to maintain her oxygen   saturations.  She was admitted overnight for observation.  This morning, she   is off her oxygen.  Her pain is controlled.  She is tolerating a diet.  She is   discharged home with instructions to follow up with me in 7-10 days.  She is   off work until seen.  She is not to lift weight greater than 10 pounds for the   next 4 weeks.  She is on a diet as tolerated and is not to drive for a week.       ____________________________________     MD ALVA Coleman / JAVIER    DD:  07/17/2019 09:21:07  DT:  07/17/2019 10:02:03    D#:  [id number]  Job#:  [id number]

## 2019-07-17 NOTE — PROGRESS NOTES
Received pt from Oli RN @ 3059. No acute distress noted. Spouse @ bedside and supportive w/ care. On 2LO2 PRN for hx of desatting to 80's while on R/A. Denies hx of OSMEL. Afebrile- denies chills. Pt tolerating regular diet post op- denies N/V. LBM 7/16 prior to surgery. Denies + flatus but reports burping. ABD sounds normoactive. Voids adequately- CLR output. Reports ABD tenderness to surgical site. Splinting w/ supportive pillows encouraged. PRN Meds adminsitered w/ + results- asleep on reassessment. 4 Gauze DRSGs to stab sites- CDI. Discussed s/s of infection and when to call the Dr. once discharged. Verbalized an understanding. All needs met @ this time. Hourly and PRN rounding in place.

## 2019-07-18 LAB
BACTERIA UR CULT: NORMAL
SIGNIFICANT IND 70042: NORMAL
SITE SITE: NORMAL
SOURCE SOURCE: NORMAL

## 2023-03-16 ENCOUNTER — APPOINTMENT (OUTPATIENT)
Dept: ADMISSIONS | Facility: MEDICAL CENTER | Age: 43
End: 2023-03-16
Attending: OBSTETRICS & GYNECOLOGY
Payer: COMMERCIAL

## 2023-03-23 ENCOUNTER — HOSPITAL ENCOUNTER (OUTPATIENT)
Dept: RADIOLOGY | Facility: MEDICAL CENTER | Age: 43
End: 2023-03-23
Attending: OBSTETRICS & GYNECOLOGY | Admitting: OBSTETRICS & GYNECOLOGY
Payer: COMMERCIAL

## 2023-03-23 ENCOUNTER — PRE-ADMISSION TESTING (OUTPATIENT)
Dept: ADMISSIONS | Facility: MEDICAL CENTER | Age: 43
End: 2023-03-23
Attending: OBSTETRICS & GYNECOLOGY
Payer: COMMERCIAL

## 2023-03-23 DIAGNOSIS — Z01.811 PRE-OPERATIVE RESPIRATORY EXAMINATION: ICD-10-CM

## 2023-03-23 DIAGNOSIS — Z01.810 PRE-OPERATIVE CARDIOVASCULAR EXAMINATION: ICD-10-CM

## 2023-03-23 LAB — EKG IMPRESSION: NORMAL

## 2023-03-23 PROCEDURE — 71045 X-RAY EXAM CHEST 1 VIEW: CPT

## 2023-03-23 PROCEDURE — 93005 ELECTROCARDIOGRAM TRACING: CPT

## 2023-03-23 PROCEDURE — 93010 ELECTROCARDIOGRAM REPORT: CPT | Performed by: INTERNAL MEDICINE

## 2023-03-28 NOTE — H&P
DATE OF ADMISSION:  2023     DATE OF OPERATION:  Thursday, 2023     CHIEF COMPLAINT:  1.  Menorrhagia.  2.  Fibroid uterus.  3.  Symptomatic pelvic pain.     HISTORY OF PRESENT ILLNESS:  The patient is a 42-year-old nulliparous female,    3, para 0, EAB 2, SAB 1, LMP 2023, who was referred for   menorrhagia, anemia, and pelvic pain.  The patient had spine problems for   years and actually had an effusion.  She had a PICC line when she developed a   DVT in her left arm from. She had to be on Coumadin for 3 months, but she was   not felt to be at risk for clotting disorder and her DVT was related to her   long-term IV.  The patient has had to go off birth control pills and she   reports her periods were initially 3 weeks long and for the last month they   only last a week, but they are debilitating, heavy with horrible cramps to the   point where she has been taking her Vicodin from her old gallbladder surgery   in 2018 to get through the pain.  She tried Tylenol, which did not seem to be   helpful and cannot take NSAIDs because she developed a rash.  She has a very   physical job as a .  She also is a tobacco user and smokes   about six cigarettes a day for the last 29 years.  She had an ultrasound,   which showed an enlarged fibroid uterus and also a large 7 cm complex mass   arising off her left ovary, probably dermoid cyst.  Uterine dimensions are   11x8x7 cm.  The patient had an ultrasound here on 2023, which showed a   325 gram uterus, multiple small fibroids, largest was 3x3 cm with probable   adenomyosis and a large 7 cm dermoid cyst.  Plan is to proceed with a   laparoscopic-assisted vaginal hysterectomy, bilateral salpingectomy, left   oophorectomy, possible bilateral oophorectomy, and also cystoscopy.     PAST MEDICAL HISTORY:  Significant for her chronic back pain, dysmenorrhea,   and menorrhagia.     PAST SURGICAL HISTORY:  Microdiscectomy  and ,  tonsillectomy 85,   spinal fusion in 2013.  She also had a repeat surgery, it sounds like maybe   due to infection at that time. Gallbladder surgery 2018, tonsillectomy in   1985, wisdom teeth extraction and D and C.     SOCIAL HISTORY:  She is .  She is a  for [employer].  Her 's name is Francisco J. Rare alcohol use, but she does smoke   about six cigarettes a day.  No IV drug use.     OBSTETRICAL HISTORY:  She has had 2 EABs, 1 SAB, has regular menstrual cycles.    No history of an abnormal Pap.  She had an endometrial biopsy and a Pap   smear performed in the last couple of months.  Her Pap smear was normal and   her endometrial biopsy was benign.  She had lab work performed, which showed   she had low iron levels, but normal blood count.     FAMILY HISTORY:  Significant for unspecified cancers in her mother, brother,   and grandparents, hypertension and hyperlipidemia.     ALLERGIES:  NSAIDS, WHICH SHE GETS A HEADACHE, NAUSEA, OR RASH. SHE HAS AN   ALLERGY TO PENICILLIN, WHICH CAUSES A RASH AND TAPE ALLERGY.     PHYSICAL EXAMINATION:    VITAL SIGNS:  Blood pressure was 140/92, her weight is 298.  She is 6 feet 9   inches, BMI is 44.  GENERAL:  Alert and oriented, no acute distress.  LUNGS:  Clear.  CORONARY:  Regular rhythm and rate.  ABDOMEN:  Soft without rebound, guarding, or organosplenomegaly.  PELVIC:  External genitalia is normal.  She has an enlarged irregular-shaped   mobile uterus.  She does have a slightly narrow introitus that I noted and I   explained to the patient that there is a possibility that I would need to   convert to an open procedure if her uterus was felt to be too big or bulky and   then I would certainly need to morcellate the uterus to deliver it vaginally   and if indeed her fibroids, she had a leiomyosarcoma, this could up stage her   disease and affect her prognosis.  The patient understands this and wishes to   proceed.     PLAN:  To proceed  with a laparoscopic-assisted vaginal hysterectomy, bilateral   salpingectomy, possible bilateral salpingo-oophorectomy, left oophorectomy,   cystoscopy, possible conversion to an open procedure.  Risks, benefits,   indications, and alternatives were discussed with the patient prior, which   include but are not limited to infection, bleeding, transfusion, transfusion   related risks, risks of intra-abdominal organ injury, injury to bowel,   bladder, need for reparative surgery, neuropathy, pelvic hematoma or abscess,   and need for rehospitalization or IV antibiotics.  The patient will be given   perioperative Lovenox, although her DVT from her PICC line was definitely   iatrogenic from her PICC line and she does have a clotting disorder.  The   patient has an intolerance to NSAIDs and/or allergy and plan is to try Tylenol   with Percocet.  The patient also knows that Tylenol has a nonnarcotic option.    She understands the risks that she may continue to have back pain or pelvic   pain, the risk of infection, bleeding, transfusion related risks including   HIV, hepatitis, risk of acquiring COVID during her hospitalization and   sequelae. All her questions answered, informed consent was obtained.        ______________________________  MD SHAKIR BATISTA/DM    DD:  03/28/2023 12:09  DT:  03/28/2023 13:04    Job#:  [id number]

## 2023-03-28 NOTE — H&P
DATE OF ADMISSION:  2023     CHIEF COMPLAINT:  1.  Menorrhagia.  2.  Fibroid uterus.  3.  Symptomatic pelvic pain.     HISTORY OF PRESENT ILLNESS:  The patient is a 42-year-old nulliparous female,    3, para 0, EAB 2, SAB 1, LMP 2023, who was referred for   menorrhagia, anemia, and pelvic pain.  The patient had spine problems for   years and actually had an effusion.  She had a PICC line when she developed a   DVT in her left arm from. She had to be on Coumadin for 3 months, but she was   not felt to be at risk for clotting disorder and her DVT was related to her   long-term IV.  The patient has had to go off birth control pills and she   reports her periods were initially 3 weeks long and for the last month they   only last a week, but they are debilitating, heavy with horrible cramps to the   point where she has been taking her Vicodin from her old gallbladder surgery   in 2018 to get through the pain.  She tried Tylenol, which did not seem to be   helpful and cannot take NSAIDs because she developed a rash.  She has a very   physical job as a .  She also is a tobacco user and smokes   about six cigarettes a day for the last 29 years.  She had an ultrasound,   which showed an enlarged fibroid uterus and also a large 7 cm complex mass   arising off her left ovary, probably dermoid cyst.  Uterine dimensions are   11x8x7 cm.  The patient had an ultrasound here on 2023, which showed a   325 gram uterus, multiple small fibroids, largest was 3x3 cm with probable   adenomyosis and a large 7 cm dermoid cyst.  Plan is to proceed with a   laparoscopic-assisted vaginal hysterectomy, bilateral salpingectomy, left   oophorectomy, possible bilateral oophorectomy, and also cystoscopy.     PAST MEDICAL HISTORY:  Significant for her chronic back pain, dysmenorrhea,   and menorrhagia.     PAST SURGICAL HISTORY:  Microdiscectomy  and , tonsillectomy 85,   spinal fusion in .  She  also had a repeat surgery, it sounds like maybe   due to infection at that time. Gallbladder surgery 2018, tonsillectomy in   1985, wisdom teeth extraction and D and C.     SOCIAL HISTORY:  She is .  She is a  for [employer].  Her 's name is Francisco J. Rare alcohol use, but she does smoke   about six cigarettes a day.  No IV drug use.     OBSTETRICAL HISTORY:  She has had 2 EABs, 1 SAB, has regular menstrual cycles.    No history of an abnormal Pap.  She had an endometrial biopsy and a Pap   smear performed in the last couple of months.  Her Pap smear was normal and   her endometrial biopsy was benign.  She had lab work performed, which showed   she had low iron levels, but normal blood count.     FAMILY HISTORY:  Significant for unspecified cancers in her mother, brother,   and grandparents, hypertension and hyperlipidemia.     ALLERGIES:  NSAIDS, WHICH SHE GETS A HEADACHE, NAUSEA, OR RASH.  SHE HAS AN   ALLERGY TO PENICILLIN, WHICH CAUSES A RASH AND TAPE ALLERGY.     PHYSICAL EXAMINATION:    VITAL SIGNS:  Blood pressure was 140/92, her weight is 298.  She is 6 feet 9   inches, BMI is 44.  GENERAL:  Alert and oriented, no acute distress.  LUNGS:  Clear.  CORONARY:  Regular rhythm and rate.  ABDOMEN:  Soft without rebound, guarding, or organosplenomegaly.  PELVIC:  External genitalia is normal.  She has an enlarged irregular-shaped   mobile uterus.  She does have a slightly narrow introitus that I noted and I   explained to the patient that there is a possibility that I would need to   convert to an open procedure if her uterus was felt to be too big or bulky and   then I would certainly need to morcellate the uterus to deliver it vaginally   and if indeed her fibroids, she had a leiomyosarcoma, this could up stage her   disease and affect her prognosis.  The patient understands this and wishes to   proceed.     PLAN:  To proceed with a laparoscopic-assisted vaginal  hysterectomy, bilateral   salpingectomy, possible bilateral salpingo-oophorectomy, left oophorectomy,   cystoscopy, possible conversion to an open procedure.  Risks, benefits,   indications, and alternatives were discussed with the patient prior, which   include but are not limited to infection, bleeding, transfusion,   transfusion-related risks, risks of intra-abdominal organ injury, injury to   bowel, bladder, need for reparative surgery, neuropathy, pelvic hematoma or   abscess, and need for rehospitalization or IV antibiotics.  The patient will   be given perioperative Lovenox, although her DVT from her PICC line was   definitely iatrogenic from her PICC line and she does have a clotting   disorder.  The patient has an intolerance to NSAIDs and/or allergy and plan is   to try Tylenol with Percocet.  The patient also knows that Tylenol is her   nonnarcotic option.  She understands the risks that she may continue to have   back pain or pelvic pain, the risk of infection, bleeding, transfusion-related   risks including HIV, hepatitis, risk of acquiring COVID during her   hospitalization and sequelae.  All her questions answered, informed consent   was obtained.        ______________________________  MD SHAKIR BATISTA/DM    DD:  03/28/2023 12:09  DT:  03/28/2023 13:04    Job#:  [id number]

## 2023-03-29 ENCOUNTER — ANESTHESIA EVENT (OUTPATIENT)
Dept: SURGERY | Facility: MEDICAL CENTER | Age: 43
End: 2023-03-29
Payer: COMMERCIAL

## 2023-03-30 ENCOUNTER — ANESTHESIA (OUTPATIENT)
Dept: SURGERY | Facility: MEDICAL CENTER | Age: 43
End: 2023-03-30
Payer: COMMERCIAL

## 2023-03-30 ENCOUNTER — HOSPITAL ENCOUNTER (OUTPATIENT)
Facility: MEDICAL CENTER | Age: 43
End: 2023-03-30
Attending: OBSTETRICS & GYNECOLOGY | Admitting: OBSTETRICS & GYNECOLOGY
Payer: COMMERCIAL

## 2023-03-30 VITALS
BODY MASS INDEX: 43.4 KG/M2 | HEART RATE: 52 BPM | OXYGEN SATURATION: 96 % | SYSTOLIC BLOOD PRESSURE: 149 MMHG | RESPIRATION RATE: 18 BRPM | TEMPERATURE: 98.2 F | HEIGHT: 69 IN | WEIGHT: 293 LBS | DIASTOLIC BLOOD PRESSURE: 86 MMHG

## 2023-03-30 PROBLEM — N92.0 MENORRHAGIA: Status: ACTIVE | Noted: 2023-03-30

## 2023-03-30 PROBLEM — D64.9 ANEMIA: Status: ACTIVE | Noted: 2023-03-30

## 2023-03-30 PROBLEM — D21.9 FIBROID: Status: ACTIVE | Noted: 2023-03-30

## 2023-03-30 PROBLEM — N94.89 ADNEXAL MASS: Status: ACTIVE | Noted: 2023-03-30

## 2023-03-30 LAB
ERYTHROCYTE [DISTWIDTH] IN BLOOD BY AUTOMATED COUNT: 53.6 FL (ref 35.9–50)
HCG UR QL: NEGATIVE
HCT VFR BLD AUTO: 46.5 % (ref 37–47)
HGB BLD-MCNC: 14 G/DL (ref 12–16)
MCH RBC QN AUTO: 24.5 PG (ref 27–33)
MCHC RBC AUTO-ENTMCNC: 30.1 G/DL (ref 33.6–35)
MCV RBC AUTO: 81.3 FL (ref 81.4–97.8)
PATHOLOGY CONSULT NOTE: NORMAL
PLATELET # BLD AUTO: 160 K/UL (ref 164–446)
PMV BLD AUTO: 10.7 FL (ref 9–12.9)
RBC # BLD AUTO: 5.72 M/UL (ref 4.2–5.4)
WBC # BLD AUTO: 13.1 K/UL (ref 4.8–10.8)

## 2023-03-30 PROCEDURE — 160046 HCHG PACU - 1ST 60 MINS PHASE II: Performed by: OBSTETRICS & GYNECOLOGY

## 2023-03-30 PROCEDURE — 700102 HCHG RX REV CODE 250 W/ 637 OVERRIDE(OP): Performed by: ANESTHESIOLOGY

## 2023-03-30 PROCEDURE — 700111 HCHG RX REV CODE 636 W/ 250 OVERRIDE (IP): Performed by: ANESTHESIOLOGY

## 2023-03-30 PROCEDURE — 700111 HCHG RX REV CODE 636 W/ 250 OVERRIDE (IP)

## 2023-03-30 PROCEDURE — 160009 HCHG ANES TIME/MIN: Performed by: OBSTETRICS & GYNECOLOGY

## 2023-03-30 PROCEDURE — 700105 HCHG RX REV CODE 258: Performed by: OBSTETRICS & GYNECOLOGY

## 2023-03-30 PROCEDURE — 110371 HCHG SHELL REV 272: Performed by: OBSTETRICS & GYNECOLOGY

## 2023-03-30 PROCEDURE — 160002 HCHG RECOVERY MINUTES (STAT): Performed by: OBSTETRICS & GYNECOLOGY

## 2023-03-30 PROCEDURE — 36415 COLL VENOUS BLD VENIPUNCTURE: CPT

## 2023-03-30 PROCEDURE — 88307 TISSUE EXAM BY PATHOLOGIST: CPT | Mod: 59

## 2023-03-30 PROCEDURE — 160029 HCHG SURGERY MINUTES - 1ST 30 MINS LEVEL 4: Performed by: OBSTETRICS & GYNECOLOGY

## 2023-03-30 PROCEDURE — 700101 HCHG RX REV CODE 250: Performed by: OBSTETRICS & GYNECOLOGY

## 2023-03-30 PROCEDURE — 160048 HCHG OR STATISTICAL LEVEL 1-5: Performed by: OBSTETRICS & GYNECOLOGY

## 2023-03-30 PROCEDURE — 85027 COMPLETE CBC AUTOMATED: CPT

## 2023-03-30 PROCEDURE — A9270 NON-COVERED ITEM OR SERVICE: HCPCS | Performed by: ANESTHESIOLOGY

## 2023-03-30 PROCEDURE — 86922 COMPATIBILITY TEST ANTIGLOB: CPT

## 2023-03-30 PROCEDURE — A9270 NON-COVERED ITEM OR SERVICE: HCPCS | Performed by: OBSTETRICS & GYNECOLOGY

## 2023-03-30 PROCEDURE — 00944 ANES VAG PX VAG HYSTERECTOMY: CPT | Performed by: ANESTHESIOLOGY

## 2023-03-30 PROCEDURE — 160047 HCHG PACU  - EA ADDL 30 MINS PHASE II: Performed by: OBSTETRICS & GYNECOLOGY

## 2023-03-30 PROCEDURE — 700102 HCHG RX REV CODE 250 W/ 637 OVERRIDE(OP): Performed by: OBSTETRICS & GYNECOLOGY

## 2023-03-30 PROCEDURE — 88305 TISSUE EXAM BY PATHOLOGIST: CPT

## 2023-03-30 PROCEDURE — 160041 HCHG SURGERY MINUTES - EA ADDL 1 MIN LEVEL 4: Performed by: OBSTETRICS & GYNECOLOGY

## 2023-03-30 PROCEDURE — 160035 HCHG PACU - 1ST 60 MINS PHASE I: Performed by: OBSTETRICS & GYNECOLOGY

## 2023-03-30 PROCEDURE — 81025 URINE PREGNANCY TEST: CPT

## 2023-03-30 PROCEDURE — 700101 HCHG RX REV CODE 250: Performed by: ANESTHESIOLOGY

## 2023-03-30 PROCEDURE — 160025 RECOVERY II MINUTES (STATS): Performed by: OBSTETRICS & GYNECOLOGY

## 2023-03-30 RX ORDER — SCOLOPAMINE TRANSDERMAL SYSTEM 1 MG/1
1 PATCH, EXTENDED RELEASE TRANSDERMAL
Status: DISCONTINUED | OUTPATIENT
Start: 2023-03-30 | End: 2023-03-30 | Stop reason: HOSPADM

## 2023-03-30 RX ORDER — ONDANSETRON 2 MG/ML
4 INJECTION INTRAMUSCULAR; INTRAVENOUS
Status: DISCONTINUED | OUTPATIENT
Start: 2023-03-30 | End: 2023-03-30 | Stop reason: HOSPADM

## 2023-03-30 RX ORDER — HYDROMORPHONE HYDROCHLORIDE 1 MG/ML
0.2 INJECTION, SOLUTION INTRAMUSCULAR; INTRAVENOUS; SUBCUTANEOUS
Status: DISCONTINUED | OUTPATIENT
Start: 2023-03-30 | End: 2023-03-30 | Stop reason: HOSPADM

## 2023-03-30 RX ORDER — BUPIVACAINE HYDROCHLORIDE AND EPINEPHRINE 2.5; 5 MG/ML; UG/ML
INJECTION, SOLUTION EPIDURAL; INFILTRATION; INTRACAUDAL; PERINEURAL
Status: DISCONTINUED | OUTPATIENT
Start: 2023-03-30 | End: 2023-03-30 | Stop reason: HOSPADM

## 2023-03-30 RX ORDER — OXYCODONE HCL 5 MG/5 ML
10 SOLUTION, ORAL ORAL
Status: DISCONTINUED | OUTPATIENT
Start: 2023-03-30 | End: 2023-03-30 | Stop reason: HOSPADM

## 2023-03-30 RX ORDER — LIDOCAINE HYDROCHLORIDE 20 MG/ML
INJECTION, SOLUTION EPIDURAL; INFILTRATION; INTRACAUDAL; PERINEURAL PRN
Status: DISCONTINUED | OUTPATIENT
Start: 2023-03-30 | End: 2023-03-30 | Stop reason: SURG

## 2023-03-30 RX ORDER — PHENYLEPHRINE HCL IN 0.9% NACL 0.5 MG/5ML
SYRINGE (ML) INTRAVENOUS PRN
Status: DISCONTINUED | OUTPATIENT
Start: 2023-03-30 | End: 2023-03-30 | Stop reason: SURG

## 2023-03-30 RX ORDER — ONDANSETRON 2 MG/ML
INJECTION INTRAMUSCULAR; INTRAVENOUS PRN
Status: DISCONTINUED | OUTPATIENT
Start: 2023-03-30 | End: 2023-03-30 | Stop reason: SURG

## 2023-03-30 RX ORDER — HYDROMORPHONE HYDROCHLORIDE 2 MG/ML
INJECTION, SOLUTION INTRAMUSCULAR; INTRAVENOUS; SUBCUTANEOUS PRN
Status: DISCONTINUED | OUTPATIENT
Start: 2023-03-30 | End: 2023-03-30 | Stop reason: SURG

## 2023-03-30 RX ORDER — MEPERIDINE HYDROCHLORIDE 25 MG/ML
6.25 INJECTION INTRAMUSCULAR; INTRAVENOUS; SUBCUTANEOUS
Status: DISCONTINUED | OUTPATIENT
Start: 2023-03-30 | End: 2023-03-30 | Stop reason: HOSPADM

## 2023-03-30 RX ORDER — ROCURONIUM BROMIDE 10 MG/ML
INJECTION, SOLUTION INTRAVENOUS PRN
Status: DISCONTINUED | OUTPATIENT
Start: 2023-03-30 | End: 2023-03-30 | Stop reason: SURG

## 2023-03-30 RX ORDER — HALOPERIDOL 5 MG/ML
1 INJECTION INTRAMUSCULAR
Status: DISCONTINUED | OUTPATIENT
Start: 2023-03-30 | End: 2023-03-30 | Stop reason: HOSPADM

## 2023-03-30 RX ORDER — EPINEPHRINE 1 MG/ML(1)
AMPUL (ML) INJECTION
Status: DISCONTINUED
Start: 2023-03-30 | End: 2023-03-30 | Stop reason: HOSPADM

## 2023-03-30 RX ORDER — DEXAMETHASONE SODIUM PHOSPHATE 4 MG/ML
INJECTION, SOLUTION INTRA-ARTICULAR; INTRALESIONAL; INTRAMUSCULAR; INTRAVENOUS; SOFT TISSUE PRN
Status: DISCONTINUED | OUTPATIENT
Start: 2023-03-30 | End: 2023-03-30 | Stop reason: SURG

## 2023-03-30 RX ORDER — ACETAMINOPHEN 500 MG
1000 TABLET ORAL ONCE
Status: COMPLETED | OUTPATIENT
Start: 2023-03-30 | End: 2023-03-30

## 2023-03-30 RX ORDER — CLINDAMYCIN PHOSPHATE 900 MG/50ML
900 INJECTION, SOLUTION INTRAVENOUS ONCE
Status: COMPLETED | OUTPATIENT
Start: 2023-03-30 | End: 2023-03-30

## 2023-03-30 RX ORDER — OXYCODONE HCL 5 MG/5 ML
5 SOLUTION, ORAL ORAL
Status: DISCONTINUED | OUTPATIENT
Start: 2023-03-30 | End: 2023-03-30 | Stop reason: HOSPADM

## 2023-03-30 RX ORDER — EPHEDRINE SULFATE 50 MG/ML
5 INJECTION, SOLUTION INTRAVENOUS
Status: DISCONTINUED | OUTPATIENT
Start: 2023-03-30 | End: 2023-03-30 | Stop reason: HOSPADM

## 2023-03-30 RX ORDER — HYDROMORPHONE HYDROCHLORIDE 1 MG/ML
0.4 INJECTION, SOLUTION INTRAMUSCULAR; INTRAVENOUS; SUBCUTANEOUS
Status: DISCONTINUED | OUTPATIENT
Start: 2023-03-30 | End: 2023-03-30 | Stop reason: HOSPADM

## 2023-03-30 RX ORDER — SODIUM CHLORIDE, SODIUM LACTATE, POTASSIUM CHLORIDE, CALCIUM CHLORIDE 600; 310; 30; 20 MG/100ML; MG/100ML; MG/100ML; MG/100ML
INJECTION, SOLUTION INTRAVENOUS CONTINUOUS
Status: ACTIVE | OUTPATIENT
Start: 2023-03-30 | End: 2023-03-30

## 2023-03-30 RX ORDER — SODIUM CHLORIDE, SODIUM LACTATE, POTASSIUM CHLORIDE, CALCIUM CHLORIDE 600; 310; 30; 20 MG/100ML; MG/100ML; MG/100ML; MG/100ML
INJECTION, SOLUTION INTRAVENOUS CONTINUOUS
Status: DISCONTINUED | OUTPATIENT
Start: 2023-03-30 | End: 2023-03-30 | Stop reason: HOSPADM

## 2023-03-30 RX ORDER — BUPIVACAINE HYDROCHLORIDE 2.5 MG/ML
INJECTION, SOLUTION EPIDURAL; INFILTRATION; INTRACAUDAL
Status: DISCONTINUED
Start: 2023-03-30 | End: 2023-03-30 | Stop reason: HOSPADM

## 2023-03-30 RX ORDER — DIPHENHYDRAMINE HYDROCHLORIDE 50 MG/ML
12.5 INJECTION INTRAMUSCULAR; INTRAVENOUS
Status: DISCONTINUED | OUTPATIENT
Start: 2023-03-30 | End: 2023-03-30 | Stop reason: HOSPADM

## 2023-03-30 RX ORDER — ENOXAPARIN SODIUM 100 MG/ML
INJECTION SUBCUTANEOUS
Status: COMPLETED
Start: 2023-03-30 | End: 2023-03-30

## 2023-03-30 RX ORDER — CLINDAMYCIN PHOSPHATE 900 MG/50ML
INJECTION, SOLUTION INTRAVENOUS
Status: COMPLETED
Start: 2023-03-30 | End: 2023-03-30

## 2023-03-30 RX ORDER — LABETALOL HYDROCHLORIDE 5 MG/ML
5 INJECTION, SOLUTION INTRAVENOUS
Status: DISCONTINUED | OUTPATIENT
Start: 2023-03-30 | End: 2023-03-30 | Stop reason: HOSPADM

## 2023-03-30 RX ORDER — HYDROMORPHONE HYDROCHLORIDE 1 MG/ML
0.1 INJECTION, SOLUTION INTRAMUSCULAR; INTRAVENOUS; SUBCUTANEOUS
Status: DISCONTINUED | OUTPATIENT
Start: 2023-03-30 | End: 2023-03-30 | Stop reason: HOSPADM

## 2023-03-30 RX ORDER — ENOXAPARIN SODIUM 100 MG/ML
40 INJECTION SUBCUTANEOUS ONCE
Status: COMPLETED | OUTPATIENT
Start: 2023-03-30 | End: 2023-03-30

## 2023-03-30 RX ORDER — HYDRALAZINE HYDROCHLORIDE 20 MG/ML
5 INJECTION INTRAMUSCULAR; INTRAVENOUS
Status: DISCONTINUED | OUTPATIENT
Start: 2023-03-30 | End: 2023-03-30 | Stop reason: HOSPADM

## 2023-03-30 RX ADMIN — ONDANSETRON 4 MG: 2 INJECTION INTRAMUSCULAR; INTRAVENOUS at 09:20

## 2023-03-30 RX ADMIN — SODIUM CHLORIDE, POTASSIUM CHLORIDE, SODIUM LACTATE AND CALCIUM CHLORIDE: 600; 310; 30; 20 INJECTION, SOLUTION INTRAVENOUS at 07:00

## 2023-03-30 RX ADMIN — ROCURONIUM BROMIDE 100 MG: 10 INJECTION, SOLUTION INTRAVENOUS at 07:37

## 2023-03-30 RX ADMIN — LIDOCAINE HYDROCHLORIDE 100 MG: 20 INJECTION, SOLUTION EPIDURAL; INFILTRATION; INTRACAUDAL at 07:37

## 2023-03-30 RX ADMIN — ACETAMINOPHEN 1000 MG: 500 TABLET, FILM COATED ORAL at 06:28

## 2023-03-30 RX ADMIN — FENTANYL CITRATE 50 MCG: 50 INJECTION, SOLUTION INTRAMUSCULAR; INTRAVENOUS at 08:10

## 2023-03-30 RX ADMIN — PROPOFOL 250 MG: 10 INJECTION, EMULSION INTRAVENOUS at 07:37

## 2023-03-30 RX ADMIN — FENTANYL CITRATE 100 MCG: 50 INJECTION, SOLUTION INTRAMUSCULAR; INTRAVENOUS at 07:36

## 2023-03-30 RX ADMIN — HYDROMORPHONE HYDROCHLORIDE 0.4 MCG: 2 INJECTION INTRAMUSCULAR; INTRAVENOUS; SUBCUTANEOUS at 09:46

## 2023-03-30 RX ADMIN — PROPOFOL 50 MG: 10 INJECTION, EMULSION INTRAVENOUS at 07:40

## 2023-03-30 RX ADMIN — FENTANYL CITRATE 25 MCG: 50 INJECTION, SOLUTION INTRAMUSCULAR; INTRAVENOUS at 12:51

## 2023-03-30 RX ADMIN — SODIUM CHLORIDE, POTASSIUM CHLORIDE, SODIUM LACTATE AND CALCIUM CHLORIDE: 600; 310; 30; 20 INJECTION, SOLUTION INTRAVENOUS at 09:09

## 2023-03-30 RX ADMIN — CLINDAMYCIN PHOSPHATE 900 MG: 900 INJECTION, SOLUTION INTRAVENOUS at 07:37

## 2023-03-30 RX ADMIN — FLUORESCEIN SODIUM 0.5 ML: 100 INJECTION, SOLUTION OPHTHALMIC at 09:52

## 2023-03-30 RX ADMIN — ENOXAPARIN SODIUM 40 MG: 100 INJECTION SUBCUTANEOUS at 06:28

## 2023-03-30 RX ADMIN — FENTANYL CITRATE 50 MCG: 50 INJECTION, SOLUTION INTRAMUSCULAR; INTRAVENOUS at 07:49

## 2023-03-30 RX ADMIN — HYDROMORPHONE HYDROCHLORIDE 0.2 MCG: 2 INJECTION INTRAMUSCULAR; INTRAVENOUS; SUBCUTANEOUS at 09:57

## 2023-03-30 RX ADMIN — PROPOFOL 50 MG: 10 INJECTION, EMULSION INTRAVENOUS at 08:20

## 2023-03-30 RX ADMIN — Medication 100 MCG: at 07:56

## 2023-03-30 RX ADMIN — DEXAMETHASONE SODIUM PHOSPHATE 4 MG: 4 INJECTION, SOLUTION INTRA-ARTICULAR; INTRALESIONAL; INTRAMUSCULAR; INTRAVENOUS; SOFT TISSUE at 07:52

## 2023-03-30 RX ADMIN — SCOLOPAMINE TRANSDERMAL SYSTEM 1 PATCH: 1 PATCH, EXTENDED RELEASE TRANSDERMAL at 06:28

## 2023-03-30 RX ADMIN — ROCURONIUM BROMIDE 10 MG: 10 INJECTION, SOLUTION INTRAVENOUS at 09:15

## 2023-03-30 RX ADMIN — Medication 100 MCG: at 07:50

## 2023-03-30 RX ADMIN — ROCURONIUM BROMIDE 10 MG: 10 INJECTION, SOLUTION INTRAVENOUS at 08:53

## 2023-03-30 RX ADMIN — FENTANYL CITRATE 50 MCG: 50 INJECTION, SOLUTION INTRAMUSCULAR; INTRAVENOUS at 08:42

## 2023-03-30 RX ADMIN — Medication 100 MCG: at 08:05

## 2023-03-30 RX ADMIN — HYDROMORPHONE HYDROCHLORIDE 0.4 MCG: 2 INJECTION INTRAMUSCULAR; INTRAVENOUS; SUBCUTANEOUS at 09:29

## 2023-03-30 RX ADMIN — SUGAMMADEX 300 MG: 100 INJECTION, SOLUTION INTRAVENOUS at 10:06

## 2023-03-30 RX ADMIN — ENOXAPARIN SODIUM 40 MG: 40 INJECTION SUBCUTANEOUS at 06:28

## 2023-03-30 ASSESSMENT — PAIN DESCRIPTION - PAIN TYPE
TYPE: SURGICAL PAIN

## 2023-03-30 NOTE — OR NURSING
1013 received patient from OR. Report from Anesthesiologist and OR RN. Patient on 6L at 99 % O2. VSS. Monitor connected. No airway in place. S/P Lap vaginal hysterectomy, bilateral salpingectomy, and left salpingo-oophorectomy, incision x2 on abdomen covered with gauze and tegaderm. Dressing CDI. Iris pad in place, minimal drainage, giraldo in place. Patient complains of no pain or nausea at this time. Tolerating oral fluids well.     1034 CBC drawn per order and sent to lab will notify MD Max with results.     1040  Francisco J notified about patients status and plan of care.     1043 Ice chips given. Sites checked, scant amount of drainage present. Will recheck before discharge.     1049 CBC resulted MD Max updated on results. Per MD can do backfill per order when patient is more awake.     1058 MD Oscar notified about patients low heart rate.     1100 Patient is phase II appropriate.     1145 Discharge instructions covered with , verbalizes understanding. All questions answered at this time.     1200 Backfilled giraldo per order, patient up to restroom.     1245 Patient voided 350 ml without complication. Post void bladder scan is 86. Patient dressed sitting up in a recliner.     1251 Fentanyl given for pain. Dressings changed.     1310- Report to April OSEAS

## 2023-03-30 NOTE — OR NURSING
1310- Report from Emmanuelle FAROOQ, care assumed. Pt reports pain at tolerable level. Declines nausea.    1340- PIV removed with tip intact. Pt escorted out of department in wheelchair with all belongings. Discharged home to responsible adult.

## 2023-03-30 NOTE — ANESTHESIA POSTPROCEDURE EVALUATION
Patient: Jessica Francois    Procedure Summary     Date: 03/30/23 Room / Location: [facility] ROOM 25 / SURGERY SAME DAY [facility]    Anesthesia Start: 0731 Anesthesia Stop: 1016    Procedures:       LAPARASCOPIC ASSISTED VAGINAL HYSTERECTOMY, BILATERAL SALPINGECTOMY, LEFT SALPINGO-OOPHORECTOMY (Abdomen)      CYSTOSCOPY (Bladder) Diagnosis: (FIBROID, PELVIC PAIN, LEFT ADNEXAL MASS)    Surgeons: Cheri Max M.D. Responsible Provider: Jenae Oscar M.D.    Anesthesia Type: general ASA Status: 3          Final Anesthesia Type: general  Last vitals  BP   Blood Pressure: 123/74    Temp   36.8 °C (98.2 °F)    Pulse   (!) 51   Resp   12    SpO2   93 %      Anesthesia Post Evaluation    Patient location during evaluation: PACU  Patient participation: complete - patient participated  Level of consciousness: awake and alert    Airway patency: patent  Anesthetic complications: no  Cardiovascular status: hemodynamically stable  Respiratory status: acceptable  Hydration status: euvolemic    PONV: none          No notable events documented.     Nurse Pain Score: 2 (NPRS)

## 2023-03-30 NOTE — ANESTHESIA PROCEDURE NOTES
Airway    Date/Time: 3/30/2023 7:40 AM  Performed by: Jenae Oscar M.D.  Authorized by: Jenae Oscar M.D.     Location:  OR  Urgency:  Elective  Indications for Airway Management:  Anesthesia      Spontaneous Ventilation: absent    Sedation Level:  Deep  Preoxygenated: Yes    Patient Position:  Sniffing  Mask Difficulty Assessment:  1 - vent by mask  Final Airway Type:  Endotracheal airway  Final Endotracheal Airway:  ETT  Cuffed: Yes    Technique Used for Successful ETT Placement:  Direct laryngoscopy    Insertion Site:  Oral  Blade Type:  Jessica  Laryngoscope Blade/Videolaryngoscope Blade Size:  3  ETT Size (mm):  7.0  Measured from:  Lips  Placement Verified by: auscultation and capnometry    Cormack-Lehane Classification:  Grade I - full view of glottis  Number of Attempts at Approach:  1

## 2023-03-30 NOTE — ANESTHESIA TIME REPORT
Anesthesia Start and Stop Event Times     Date Time Event    3/30/2023 0716 Ready for Procedure     0731 Anesthesia Start     1016 Anesthesia Stop        Responsible Staff  03/30/23    Name Role Begin End    Jenae Oscar M.D. Anesth 0731 1016        Overtime Reason:  no overtime (within assigned shift)    Comments:

## 2023-03-30 NOTE — OP REPORT
Date of operation 3/30/2023    Pre-op diagnosis: Menorrhagia, anemia, pelvic pain back pain, dysfunctional uterine bleeding, large left complex adnexal mass, morbid obesity, tobacco use    Postop diagnosis: The same    Operation surgeries performed: Laparoscopic assisted vaginal hysterectomy bilateral salpingectomy left oophorectomy right ovarian biopsy cystoscopy    Surgeon: Cheri Max MD    Assistant:misty Hauser MD    Anesthesia: Dr. Hand General endotracheal    Complications: None    Findings: Enlarged multifibroid uterus right lower uterine segment fibroid about 3 to 4 cm obscuring right adnexa, large left probable dermoid cyst about 7 cm, right fleshy she lesion on the right ovary, normal bladder emitting excellent jets of fluorescein stain urine    Complications: None    EBL: 200 cc    Urine output 500 cc of clear urine    Fluids in over 1 L of fluid    Procedure performed in detail: The patient is a 43-year-old nulliparous female who presented for complaints of a long history of heavy periods that have progressively gotten worse.  Patient does use tobacco is not a candidate for birth control pills which she was on previously.  Patient had an ultrasound which revealed a large multifibroid uterus in addition to a large complex adnexal mass on her left adnexa.  Patient also had pelvic pain back pain for quite some time and had low iron levels.  Patient wished to proceed with a laparoscopic-assisted vaginal hysterectomy possible bilateral salpingo-oophorectomy salpingectomy cystoscopy.    Risk benefits indications and alternatives were discussed with the patient prior which include but not limited to infection bleeding transfusion transfusion related complications.  She also was placed on 1 dose of Lovenox preop because of a history of having a DVT in her arm from a PICC line from prolonged antibiotics after a second back surgery.  Patient also is a tobacco user and I discussed that that can affect healing  and she may be at risk for infection or wound separation or dehiscence.  Patient was encouraged to discontinue prior to her surgery date.  Patient was not a candidate for embolization or a hydrothermal ablation due to the multifocal fibroids in addition to the adnexal mass and could not rule out an ovarian cancer.  Patient also is morbidly obese and has an increased risk of infection difficulty surgically due to anatomy and body habitus.  Risks of infection bleeding transfusion transfusion related complications risk of intra-abdominal organ injury to bowel bladder need for surgical repair need for reoperation increased risk for infection and poor wound healing risks of difficulty positioning patient due to her size and increased risk for neuropathy pelvic hematoma bruising neuropathy.  Patient also has the increased risk for bladder injury due to large fibroid uterus and need for repair or surgery.  Risk of general anesthesia including death.  Risk of acquiring COVID during her hospitalization and prolonged sequelae.  All her questions were answered informed consent was obtained.    Patient was taken to the operating room with a running IV and general anesthesia was administered without difficulty she received clindamycin myosin on-call the OR due to a allergy to penicillin that she reports a total body rash.  General anesthesia was administered patient was prepped and draped in the usual sterile fashion Green catheter was placed sterilely by myself timeout was called to verify patient and procedure.  Weighted speculum was placed in the vagina and a right angle retractor retract anterior bladder fold.  A single-tooth tenaculum was used to grasp the anterior cervix.  Uterus sounded to 10 cm a size 10 Arlin was advanced to the uterus and the bulb inflated.  All other instruments were removed from the vagina.    Attention was turned to the abdomen where quarter percent Marcaine was injected in the subumbilical fold.   Veress needle was introduced and initial pressures were -1-2 we then increased to high flow insufflation.  It had passed the drop test prior to insufflation.  After approximately 4 L received 2 gas were administered the patient was removed and a size 11 trocar advanced to the site.  Survey of the patient's pelvis revealed the above findings.  A second stab incision was made 2 cm above the symphysis pubis in the midline after core percent Marcaine was injected and a 5 mm incision made.  Under direct visualization a 5 mm trocar was advanced to the site.  Procedure grasper was used to help surveyed the patient's anatomy.  We switched out the 5 mm lower port with a size 12 once we evaluated the left adnexa noted to be a noted to be quite large and heavy.  We then grasped the right fleshy mass noted on the right ovary and remove that with SalesVu LigaSure apparatus to the operating port and sent that off separately as a specimen.  The ovary otherwise appeared normal.  The right round ligament was grasped cauterized with the Covidien LigaSure apparatus opening up the leaves of the broad ligament right fallopian tube had been removed separately utero-ovarian ligament was cauterized cut we opened up the leaves of the broad ligament and cauterized the uterine vessels individually created a bladder flap she did have a right lower uterine uterine segment fibroid that was somewhat obscuring the right adnexa but were able to visualize well enough to get around it.    The left adnexal mass was lifted and detached from the utero-ovarian ligament as well as we had to grasp it and find the infundibulopelvic ligament and cauterized that.  We then placed it in an Endo Catch bag through the lower port and brought it up to the lower incision extending that in order to open up the bag and then placed a scalpel to open up the mass and drain it with the suction .  Once we reduce the mass size we were able to remove it en bloc  intact in the Endo Catch bag and replaced the 12 mm trocar through this site.  We then completed the surgery by cauterize the round ligament separate the leaves of the broad ligament and visualized the uterine vessels on the that side cauterizing them individually.  We then extended the incision down to meet the bladder flap in the midline and was able to dissect the bladder off the lower uterine segment.  Copious irrigation was performed excellent hemostasis was noted all instruments were removed from the abdominal pelvic cavity.    Patient was repositioned and the single-tooth tenaculum was placed anteriorly and posteriorly after the Arlin manipulator was removed.  Cervix was circumferentially injected with quarter percent Marcaine using approximately 10 cc.  A Bovie electrocautery was used to incise the cervix circumferentially.  Posterior cul-de-sac was entered sharply with Ordoñez scissors followed by the anterior cul-de-sac with Metzenbaum scissors.  Uterosacral ligaments were clamped cut Aquilino suture-ligated with 0 Vicryl followed by the cardinal ligaments.  The remaining uterine vessels were clamped cut and suture-ligated with 0 Vicryl.  The uterus was delivered En bloc intact.  The posterior vaginal cuff was run with a baseball stitch of 0 Vicryl in a locked pattern.  The vaginal cuff was closed transversely with a running lock stitch of 0 Vicryl.  A couple interrupted stitches of 0 Vicryl were placed in the midline for small amount of bruising.  Sponge stick was placed in the vagina and attention was turned to the abdomen where the belly was reinsufflated.  Copious irrigation was performed she did have some oozing near the infundibulopelvic ligament on the right side which had to be cauterized.  Copious irrigation was performed we looked at all pedicles noted to be hemostatic and we placed Surgicel and it remained dry even after remove the insufflation.  All instruments removed from the abdominal pelvic cavity  the fascia was closed with running lock stitches of 0 Vicryl subcuticular stitch of 4-0 Vicryl was used to close all the skin incisions.    Patient was repositioned and the Green catheter was removed and cystoscopy was performed with a 30 degree lens cystoscope.  All bladder appeared intact and the ureters a minute excellent jets of fluorescein stained urine.  The Green catheter was then replaced.    Pathology: Cervix uterus bilateral fallopian tubes left adnexal mass and ovary right ovarian biopsy

## 2023-03-30 NOTE — DISCHARGE INSTRUCTIONS
If any questions arise, call your provider.  If your provider is not available, please feel free to call the Surgical Center at [phone].    MEDICATIONS: Resume taking daily medication.  Take prescribed pain medication with food.  If no medication is prescribed, you may take non-aspirin pain medication if needed.  PAIN MEDICATION CAN BE VERY CONSTIPATING.  Take a stool softener or laxative such as senokot, pericolace, or milk of magnesia if needed.    Last pain medication given: Scopolamine patch applied at 6:30 am, this patch can stay on for up to 72 hours. It can be removed early if side effects such as dilated pupils, dry mouth or irritability occur. Once patch is removed, wash hands immediately and avoid touching your eyes.   Tylenol given at 6:30 am, next dose can be taken at 12:30 pm (every 6 hours as needed).     What to Expect Post Anesthesia    Rest and take it easy for the first 24 hours.  A responsible adult is recommended to remain with you during that time.  It is normal to feel sleepy.  We encourage you to not do anything that requires balance, judgment or coordination.    FOR 24 HOURS DO NOT:  Drive, operate machinery or run household appliances.  Drink beer or alcoholic beverages.  Make important decisions or sign legal documents.    To avoid nausea, slowly advance diet as tolerated, avoiding spicy or greasy foods for the first day.  Add more substantial food to your diet according to your provider's instructions.  Babies can be fed formula or breast milk as soon as they are hungry.  INCREASE FLUIDS AND FIBER TO AVOID CONSTIPATION.    MILD FLU-LIKE SYMPTOMS ARE NORMAL.  YOU MAY EXPERIENCE GENERALIZED MUSCLE ACHES, THROAT IRRITATION, HEADACHE AND/OR SOME NAUSEA.

## 2023-03-30 NOTE — ANESTHESIA PREPROCEDURE EVALUATION
Case: [id number] Date/Time: 03/30/23 0715    Procedure: LAPARASCOPIC ASSISTED VAGINAL HYSTERECTOMY, BILATERAL SALPINGECTOMY, LEFT SALPINGO-OOPHORECTOMY, POSSIBLE BILATERAL SALPINGO-OOPHORECTOMY    Pre-op diagnosis: FIBROID    Location: [facility] ROOM 25 / SURGERY SAME DAY [facility]    Surgeons: Cheri Max M.D.        Active smoker  Relevant Problems   PULMONARY   (negative) Asthma   (negative) Chronic obstructive pulmonary disease (COPD) (HCC)      CARDIAC   (positive) DVT (deep venous thrombosis) PICC line associated   (positive) HTN (hypertension)   (negative) CAD (coronary artery disease)   (negative) Dysrhythmia      GI   (negative) Gastroesophageal reflux disease      ENDO   (negative) Diabetes mellitus, type 2 (HCC)      Other   (positive) Obesity (BMI 35.0-39.9 without comorbidity)       Physical Exam    Airway   Mallampati: II  TM distance: >3 FB  Neck ROM: full       Cardiovascular - normal exam  Rhythm: regular  Rate: normal  (-) murmur     Dental - normal exam           Pulmonary - normal exam  Breath sounds clear to auscultation     Abdominal    Neurological - normal exam                 Anesthesia Plan    ASA 3       Plan - general               Induction: intravenous    Postoperative Plan: Postoperative administration of opioids is intended.    Pertinent diagnostic labs and testing reviewed    Informed Consent:    Anesthetic plan and risks discussed with patient.

## 2023-03-31 LAB
BARCODED ABORH UBTYP: 7300
BARCODED ABORH UBTYP: 7300
BARCODED PRD CODE UBPRD: NORMAL
BARCODED PRD CODE UBPRD: NORMAL
BARCODED UNIT NUM UBUNT: NORMAL
BARCODED UNIT NUM UBUNT: NORMAL
COMPONENT R 8504R: NORMAL
COMPONENT R 8504R: NORMAL
PRODUCT TYPE UPROD: NORMAL
PRODUCT TYPE UPROD: NORMAL
UNIT STATUS USTAT: NORMAL
UNIT STATUS USTAT: NORMAL

## 2023-10-09 ENCOUNTER — OCCUPATIONAL MEDICINE (OUTPATIENT)
Dept: URGENT CARE | Facility: PHYSICIAN GROUP | Age: 43
End: 2023-10-09
Payer: COMMERCIAL

## 2023-10-09 ENCOUNTER — HOSPITAL ENCOUNTER (OUTPATIENT)
Dept: RADIOLOGY | Facility: MEDICAL CENTER | Age: 43
End: 2023-10-09
Attending: PHYSICIAN ASSISTANT
Payer: COMMERCIAL

## 2023-10-09 ENCOUNTER — APPOINTMENT (OUTPATIENT)
Dept: URGENT CARE | Facility: PHYSICIAN GROUP | Age: 43
End: 2023-10-09

## 2023-10-09 VITALS
SYSTOLIC BLOOD PRESSURE: 124 MMHG | DIASTOLIC BLOOD PRESSURE: 82 MMHG | OXYGEN SATURATION: 97 % | BODY MASS INDEX: 43.4 KG/M2 | WEIGHT: 293 LBS | RESPIRATION RATE: 14 BRPM | HEART RATE: 67 BPM | HEIGHT: 69 IN | TEMPERATURE: 96.8 F

## 2023-10-09 DIAGNOSIS — M54.50 ACUTE MIDLINE LOW BACK PAIN WITHOUT SCIATICA: ICD-10-CM

## 2023-10-09 DIAGNOSIS — S39.012A STRAIN OF LUMBAR REGION, INITIAL ENCOUNTER: ICD-10-CM

## 2023-10-09 PROCEDURE — 3079F DIAST BP 80-89 MM HG: CPT | Performed by: PHYSICIAN ASSISTANT

## 2023-10-09 PROCEDURE — 72100 X-RAY EXAM L-S SPINE 2/3 VWS: CPT

## 2023-10-09 PROCEDURE — 3074F SYST BP LT 130 MM HG: CPT | Performed by: PHYSICIAN ASSISTANT

## 2023-10-09 PROCEDURE — 99203 OFFICE O/P NEW LOW 30 MIN: CPT | Performed by: PHYSICIAN ASSISTANT

## 2023-10-09 RX ORDER — VARENICLINE TARTRATE 1 MG/1
1 TABLET, FILM COATED ORAL 2 TIMES DAILY
COMMUNITY
Start: 2023-09-21

## 2023-10-09 ASSESSMENT — ENCOUNTER SYMPTOMS: BACK PAIN: 1

## 2023-10-09 NOTE — LETTER
[facility] [address] [place] [address]  Phone:  [phone] - Fax:  [phone]   [facility] Progress Report and Disability Certification  Date of Service: 10/9/2023   No Show:  No  Date / Time of Next Visit: 10/13/2023   Claim Information   Patient Name: Jessica Francois  Claim Number:     Employer:    Date of Injury: 10/2/2023     Insurer / TPA: Cameron Anton  ID / SSN:     Occupation:   Diagnosis: Diagnoses of Acute midline low back pain without sciatica and Strain of lumbar region, initial encounter were pertinent to this visit.    Medical Information   Related to Industrial Injury? 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  Patient has been going to work still but just not bending over or lifting anything and that has been tolerable.  Patient states she did take Tylenol with some relief.  Does have previous history of L4-L5 fusion in the past.  No numbness, tingling, saddle anesthesia or bowel or bladder dysfunction or any other red flag signs at this time.     Objective Findings: Patient has full range of motion of upper and lower extremities.  Does have discomfort to palpation just off the midline to the left lumbar paraspinous area.  Surgical scarring appreciated to the midline of the lumbar back.  Tenderness is just outside of the surgical scarring on the left side.  No bruising or swelling.  No specific midline spinous process tenderness noted.  Normal gait.  Reduced flexion and twisting secondary to discomfort in the lower back.   Pre-Existing Condition(s):     Assessment:   Initial Visit    Status: Additional Care Required  Permanent  Disability:No    Plan:      Diagnostics:      Comments:       Disability Information   Status: Released to Restricted Duty    From:  10/9/2023  Through: 10/13/2023 Restrictions are: Temporary   Physical Restrictions   Sitting:    Standing:    Stooping:    Bendin hrs/day   Squattin hrs/day Walking:    Climbin hrs/day Pushin hrs/day   Pullin hrs/day Other:    Reaching Above Shoulder (L):   Reaching Above Shoulder (R):       Reaching Below Shoulder (L):    Reaching Below Shoulder (R):      Not to exceed Weight Limits   Carrying(hrs):   Weight Limit(lb): < or = to 10 pounds Lifting(hrs):   Weight  Limit(lb): < or = to 10 pounds   Comments: I would suggest patient continue with over-the-counter remedies such as Tylenol per 's instructions.  She can also trial topical lidocaine or IcyHot patches per 's instructions.  Would suggest ice/heat to the area when not using the patches and also gentle stretching and range of motion exercises.  Light duty restrictions as outlined above with no bending or heavy lifting, pushing, pulling.    Repetitive Actions   Hands: i.e. Fine Manipulations from Grasping:     Feet: i.e. Operating Foot Controls:     Driving / Operate Machinery:     Health Care Provider’s Original or Electronic Signature  Jose Pineda P.A.-C. Health Care Provider’s Original or Electronic Signature    Torrey Blum DO MPH     Clinic Name / Location: [address] [address] Clinic Phone Number: Dept: [phone]   Appointment Time: 11:55 Am Visit Start Time: 12:29 PM   Check-In Time:  12:16 Pm Visit Discharge Time:  1:45 PM   Original-Treating Physician or Chiropractor    Page 2-Insurer/TPA    Page 3-Employer    Page 4-Employee

## 2023-10-09 NOTE — LETTER
"EMPLOYEE’S CLAIM FOR COMPENSATION/ REPORT OF INITIAL TREATMENT  FORM C-4  PLEASE TYPE OR PRINT    EMPLOYEE’S CLAIM - PROVIDE ALL INFORMATION REQUESTED   First Name  Jessica Last Name  Priscila Birthdate                    1980                Sex  female Claim Number (Insurer’s Use Only)   Home Address  [address] [facility] Age  43 y.o. Height  1.753 m (5' 9\") Weight  (!) 138 kg (305 lb) [facility]     [facility] Zip  [address] Telephone  [phone] (home) [phone] (work)   Mailing Address  [address] [facility] Zip  [address] Primary Language Spoken  English    INSURER   THIRD-PARTY     Cameron Anton   Employee's Occupation (Job Title) When Injury or Occupational Disease Occurred      Employer's Name/Company Name     Telephone      Office Mail Address (Number and Street)          Date of Injury  10/2/2023               Hours Injury  10:30 AM Date Employer Notified  10/8/2023 Last Day of Work after Injury or Occupational Disease  10/9/2023 Supervisor to Whom Injury     Reported  [place]   Address or Location of Accident (if applicable)  Work [1]   What were you doing at the time of accident? (if applicable)  opening + closing a large door that gets stuck.    How did this injury or occupational disease occur? (Be specific and answer in detail. 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 when you open or close the door it gets studs and one person has to pull from the front + one push from the back after 2 shifts of 12 hours + doing this every 307   If you believe that you have an occupational disease, when did you first have knowledge of the disability and its relationship to your employment?   Witnesses to the Accident (if applicable)  N/A      Nature of Injury or Occupational Disease  Workers' " Compensation  Part(s) of Body Injured or Affected  Lower Back Area (Lumbar Area & Lumbo-Sacral), N/A, N/A    I CERTIFY THAT THE ABOVE IS TRUE AND CORRECT TO T HE BEST OF MY KNOWLEDGE AND THAT I HAVE PROVIDED THIS INFORMATION IN ORDER TO OBTAIN THE BENEFITS OF [place], OR [place]).  I HEREBY AUTHORIZE ANY PHYSICIAN, CHIROPRACTOR, SURGEON, PRACTITIONER OR ANY OTHER PERSON, ANY HOSPITAL, INCLUDING [facility] OR [facility], ANY  MEDICAL SERVICE ORGANIZATION, ANY INSURANCE COMPANY, OR OTHER INSTITUTION OR ORGANIZATION TO RELEASE TO EACH OTHER, ANY MEDICAL OR OTHER INFORMATION, INCLUDING BENEFITS PAID OR PAYABLE, PERTINENT TO THIS INJURY OR DISEASE, EXCEPT INFORMATION RELATIVE TO DIAGNOSIS, TREATMENT AND/OR COUNSELING FOR AIDS, PSYCHOLOGICAL CONDITIONS, ALCOHOL OR CONTROLLED SUBSTANCES, FOR WHICH I MUST GIVE SPECIFIC AUTHORIZATION.  A PHOTOSTAT OF THIS AUTHORIZATION SHALL BE VALID AS THE ORIGINAL.       Date   Place Employee’s Original or  *Electronic Signature   THIS REPORT MUST BE COMPLETED AND MAILED WITHIN 3 WORKING DAYS OF TREATMENT   Place  [facility]  Name of Facility  [place]   Date  10/9/2023 Diagnosis and Description of Injury or Occupational Disease  (M54.50) Acute midline low back pain without sciatica  (S39.012A) Strain of lumbar region, initial encounter Is there evidence that the injured employee was under the influence of alcohol and/or another controlled substance at the time of accident?  ? No ? Yes (if yes, please explain)   Hour  12:29 PM   Diagnoses of Acute midline low back pain without sciatica and Strain of lumbar region, initial encounter were pertinent to this visit. No   Treatment  I would suggest patient continue with over-the-counter remedies such as Tylenol per 's instructions.  She can also trial topical lidocaine or IcyHot patches per 's  instructions.  Would suggest ice/heat to the area when not using the patches and also gentle stretching and range of motion exercises.  Light duty restrictions as outlined above with no bending or heavy lifting, pushing, pulling.  Have you advised the patient to remain off work five days or     more?    X-Ray Findings  Negative   ? Yes Indicate dates:   From   To      From information given by the employee, together with medical evidence, can        you directly connect this injury or occupational disease as job incurred?  Yes ? No If no, is the injured employee capable of:  ? full duty  No ? modified duty  Yes   Is additional medical care by a physician indicated?  Yes If modified duty, specify any limitations / restrictions  Continue with restricted duty including no heavy lifting or bending, squatting, pushing or pulling.  As outlined on day 39.   Do you know of any previous injury or disease contributing to this condition or occupational disease?  ? Yes ? No (Explain if yes)                          Yes  Comments:previous low back surgery   Date  10/9/2023 Print Health Care Provider's  Name  Rosanna Pineda P.A.-C. I certify that the employer’s copy of  this form was delivered to the employer on:   Address  [address] [place] [address]. Insurer’s Use Only     [facility] Zip  [address]    Provider’s Tax ID Number  [id number] Telephone  Dept: [phone]             Health Care Provider’s Original or Electronic Signature  e-ROSANNA Johnson P.A.-C. Degree (MD,DO, DC,PANATE,APRN)  PADominikC      * Complete and attach Release of Information (Form C-4A) when injured employee signs C-4 Form electronically  ORIGINAL - TREATING HEALTHCARE PROVIDER PAGE 2 - INSURER/TPA PAGE 3 - EMPLOYER PAGE 4 - EMPLOYEE             Form C-4 (rev.08/21)           BRIEF DESCRIPTION OF RIGHTS AND BENEFITS  (Pursuant to [place] 616C.050)    Notice of Injury or Occupational Disease (Incident Report Form C-1): If an injury or occupational disease (OD)  "arises out of and in the course of employment, you must provide written notice to your employer as soon as practicable, but no later than 7 days after the accident or OD. Your employer shall maintain a sufficient supply of the required forms.    Claim for Compensation (Form C-4): If medical treatment is sought, the form C-4 is available at the place of initial treatment. A completed \"Claim for Compensation\" (Form C-4) must be filed within 90 days after an accident or OD. The treating physician or chiropractor must, within 3 working days after treatment, complete and mail to the employer, the employer's insurer and third-party , the Claim for Compensation.    Medical Treatment: If you require medical treatment for your on-the-job injury or OD, you may be required to select a physician or chiropractor from a list provided by your workers’ compensation insurer, if it has contracted with an Organization for Managed Care (MCO) or Preferred Provider Organization (PPO) or providers of health care. If your employer has not entered into a contract with an MCO or PPO, you may select a physician or chiropractor from the Panel of Physicians and Chiropractors. Any medical costs related to your industrial injury or OD will be paid by your insurer.    Temporary Total Disability (TTD): If your doctor has certified that you are unable to work for a period of at least 5 consecutive days, or 5 cumulative days in a 20-day period, or places restrictions on you that your employer does not accommodate, you may be entitled to TTD compensation.    Temporary Partial Disability (TPD): If the wage you receive upon reemployment is less than the compensation for TTD to which you are entitled, the insurer may be required to pay you TPD compensation to make up the difference. TPD can only be paid for a maximum of 24 months.    Permanent Partial Disability (PPD): When your medical condition is stable and there is an indication of a PPD " as a result of your injury or OD, within 30 days, your insurer must arrange for an evaluation by a rating physician or chiropractor to determine the degree of your PPD. The amount of your PPD award depends on the date of injury, the results of the PPD evaluation, your age and wage.    Permanent Total Disability (PTD): If you are medically certified by a treating physician or chiropractor as permanently and totally disabled and have been granted a PTD status by your insurer, you are entitled to receive monthly benefits not to exceed 66 2/3% of your average monthly wage. The amount of your PTD payments is subject to reduction if you previously received a lump-sum PPD award.    Vocational Rehabilitation Services: You may be eligible for vocational rehabilitation services if you are unable to return to the job due to a permanent physical impairment or permanent restrictions as a result of your injury or occupational disease.    Transportation and Per Elenita Reimbursement: You may be eligible for travel expenses and per elenita associated with medical treatment.    Reopening: You may be able to reopen your claim if your condition worsens after claim closure.     Appeal Process: If you disagree with a written determination issued by the insurer or the insurer does not respond to your request, you may appeal to the Department of Administration, , by following the instructions contained in your determination letter. You must appeal the determination within 70 days from the date of the determination letter at [address], [address], [place], [place] [address], or [address], [place] [address]. If you disagree with the  decision, you may appeal to the Department of Administration, . You must file your appeal within 30 days from the date of the  decision letter at [address], [address], [place], [place] [address], or [address]  [facility], [address], [place], [place] [address]. If you disagree with a decision of an , you may file a petition for judicial review with the District Court. You must do so within 30 days of the Appeal Officer’s decision. You may be represented by an  at your own expense or you may contact the [facility] for possible representation.    [place]  for Injured Workers (NAIW): If you disagree with a  decision, you may request that NAIW represent you without charge at an  Hearing. For information regarding denial of benefits, you may contact the [facility] at: [address], [address], [place] [address], [phone], or [address] [facility] [facility], [address], [place] [address], [phone]    To File a Complaint with the Division: If you wish to file a complaint with the  of the Division of Industrial Relations (DIR),  please contact the Workers’ Compensation Section, [address], [address], [place], [place] [address], telephone [phone], or [address], [address], [place], [place] [address], telephone [phone].    For assistance with Workers’ Compensation Issues: You may contact the [facility] Office for Consumer Health Assistance, [address], [address], [place], [place] [address], Toll Free [phone], Web site: http://[facility].[place].gov/Programs/MAGUI E-mail: magui@[facility].nv.gov              __________________________________________________________________                                    _________________            Employee Name / Signature                                                                                                                            Date                                                                                                                                                                                                                              D-2 (rev. 10/20)

## 2023-10-09 NOTE — PROGRESS NOTES
"Subjective     Jessica Francois is a 43 y.o. female who presents with Low Back Pain (At work; x2 days. 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  Patient has been going to work still but just not bending over or lifting anything and that has been tolerable.  Patient states she did take Tylenol with some relief.  Does have previous history of L4-L5 fusion in the past.  No numbness, tingling, saddle anesthesia or bowel or bladder dysfunction or any other red flag signs at this time.       HPI  Patient presents today for evaluation of work-related injury as described above.  Review of Systems   Musculoskeletal:  Positive for back pain.     PMH: No pertinent past medical history to this problem  MEDS: Medications were reviewed in Epic  ALLERGIES: Allergies were reviewed in Epic  SOCHX: Works as a   FH: No pertinent family history to this problem         Objective     /82   Pulse 67   Temp 36 °C (96.8 °F)   Resp 14   Ht 1.753 m (5' 9\")   Wt (!) 138 kg (305 lb)   SpO2 97%   BMI 45.04 kg/m²      Physical Exam  Vitals and nursing note reviewed.   Constitutional:       General: She is not in acute distress.     Appearance: Normal appearance. She is well-developed. She is not ill-appearing or toxic-appearing.   HENT:      Head: Normocephalic and atraumatic.      Right Ear: Hearing normal.      Left Ear: Hearing normal.   Cardiovascular:      Rate and Rhythm: Normal rate.   Pulmonary:      Effort: Pulmonary effort is normal.   Musculoskeletal:      Comments: As described below.   Skin:     General: Skin is warm and dry.   Neurological:      " Mental Status: She is alert.      Coordination: Coordination normal.   Psychiatric:         Mood and Affect: Mood normal.       Patient has full range of motion of upper and lower extremities.  Does have discomfort to palpation just off the midline to the left lumbar paraspinous area.  Surgical scarring appreciated to the midline of the lumbar back.  Tenderness is just outside of the surgical scarring on the left side.  No bruising or swelling.  No specific midline spinous process tenderness noted.  Normal gait.  Reduced flexion and twisting secondary to discomfort in the lower back.      DX LUMBAR  FINDINGS:  Vertebral body height is well maintained.  There is no evidence of fracture.  Vertebral alignment is normal.  There is moderate degenerative disc disease and facet arthropathy at L4-L5. Posterior fusion laminectomy changes are also noted at L4-L5.        IMPRESSION:     1.  No compression deformity or acute fracture is identified.     2.  Degenerative disc disease and facet arthropathy. Increased compared to prior exam.     3.  Postoperative changes are noted at L4-L5.         Assessment & Plan   1. Acute midline low back pain without sciatica    - DX-LUMBAR SPINE-2 OR 3 VIEWS; Future    2. Strain of lumbar region, initial encounter        I would suggest patient continue with over-the-counter remedies such as Tylenol per 's instructions.  She can also trial topical lidocaine or IcyHot patches per 's instructions.  Would suggest ice/heat to the area when not using the patches and also gentle stretching and range of motion exercises.  Light duty restrictions as outlined above with no bending or heavy lifting, pushing, pulling.     Please note that this dictation was created using voice recognition software. I have made every reasonable attempt to correct obvious errors, but I expect that there may be errors of grammar and possibly content that I did not discover before finalizing the  note.

## 2023-10-13 ENCOUNTER — OCCUPATIONAL MEDICINE (OUTPATIENT)
Dept: URGENT CARE | Facility: PHYSICIAN GROUP | Age: 43
End: 2023-10-13
Payer: COMMERCIAL

## 2023-10-13 VITALS
OXYGEN SATURATION: 97 % | HEIGHT: 69 IN | DIASTOLIC BLOOD PRESSURE: 86 MMHG | SYSTOLIC BLOOD PRESSURE: 128 MMHG | HEART RATE: 77 BPM | RESPIRATION RATE: 16 BRPM | BODY MASS INDEX: 43.4 KG/M2 | TEMPERATURE: 98.3 F | WEIGHT: 293 LBS

## 2023-10-13 DIAGNOSIS — S39.012D STRAIN OF LUMBAR REGION, SUBSEQUENT ENCOUNTER: ICD-10-CM

## 2023-10-13 DIAGNOSIS — M54.50 ACUTE MIDLINE LOW BACK PAIN WITHOUT SCIATICA: ICD-10-CM

## 2023-10-13 PROCEDURE — 3074F SYST BP LT 130 MM HG: CPT

## 2023-10-13 PROCEDURE — 3079F DIAST BP 80-89 MM HG: CPT

## 2023-10-13 PROCEDURE — 99213 OFFICE O/P EST LOW 20 MIN: CPT

## 2023-10-13 NOTE — LETTER
[facility] [address] [facility] [place] [address]  Phone:  [phone] - Fax:  [phone]   [facility] Progress Report and Disability Certification  Date of Service: 10/13/2023   No Show:  No  Date / Time of Next Visit:     Claim Information   Patient Name: Jessica Francois  Claim Number:     Employer:    Date of Injury: 10/2/2023     Insurer / TPA: Cameron Anton  ID / SSN:     Occupation:   Diagnosis: Diagnoses of Acute midline low back pain without sciatica and Strain of lumbar region, subsequent encounter were pertinent to this visit.    Medical Information   Related to Industrial Injury?      Subjective Complaints:  Patient presents for first  FV today.  DOI: 10/2/2023 chief complaint: Low back pain  Patient reports resolution of low back pain.  She states she is able to move around freely without pain.  She does have a history of low back injuries, however she states the severity of the pain is back to her a regular baseline.  She states she has been very careful with how she lifts and walks.  Rates pain as a 2-3 out of 10.  Denies radiation of pain, weakness, decreased range of motion.   She states she is ready to return to work full duty.  She would like to be discharged from Workmen's Compensation.   Objective Findings: Bilateral lumbar paraspinal muscles are tender to palpation. No midline tenderness. No deformity, dislocation, crepitus, bony step offs. Cervical and thoracic spinous processes are not tender to palpation. No tenderness to palpation of bilateral hips. Full and active ROM. Sensation is intact to light and sharp touch and is symmetric, cap refill is less than 2 seconds, 2+ DP pulses bilaterally. No edema, erythema, fluctuance, warmth. No ecchymosis. No rash. Negative R SLR, negative L SLR.    Pre-Existing Condition(s):     Assessment:   Condition Improved    Status: Discharged /  MMI  Permanent Disability:No    Plan:       Diagnostics:      Comments:       Disability Information   Status: Released to Full Duty    From:  10/13/2023  Through:   Restrictions are:     Physical Restrictions   Sitting:    Standing:    Stooping:    Bending:      Squatting:    Walking:    Climbing:    Pushing:      Pulling:    Other:    Reaching Above Shoulder (L):   Reaching Above Shoulder (R):       Reaching Below Shoulder (L):    Reaching Below Shoulder (R):      Not to exceed Weight Limits   Carrying(hrs):   Weight Limit(lb):   Lifting(hrs):   Weight  Limit(lb):     Comments: D39 updated today  Patient reports resolution of acute low back pain.  She is discharged, MMI, released to full duty    Repetitive Actions   Hands: i.e. Fine Manipulations from Grasping:     Feet: i.e. Operating Foot Controls:     Driving / Operate Machinery:     Health Care Provider’s Original or Electronic Signature  CELE Sanchez Health Care Provider’s Original or Electronic Signature    Torrey Blum DO MPH     Clinic Name / Location: [address] [address] Clinic Phone Number: Dept: [phone]   Appointment Time: 1:00 Pm Visit Start Time: 12:55 PM   Check-In Time:  12:48 Pm Visit Discharge Time:  1:50 PM   Original-Treating Physician or Chiropractor    Page 2-Insurer/TPA    Page 3-Employer    Page 4-Employee

## 2023-10-13 NOTE — LETTER
[facility] [place]  [address] [place] [address]  Phone:  [phone] - Fax:  [phone]   [facility] Progress Report and Disability Certification  Date of Service: 10/13/2023   No Show:  No  Date / Time of Next Visit:     Claim Information   Patient Name: Jessica Francois  Claim Number:     Employer:   *** Date of Injury: 10/2/2023     Insurer / TPA: Cameron Anton *** ID / SSN:     Occupation:  *** Diagnosis: Diagnoses of Acute midline low back pain without sciatica and Strain of lumbar region, initial encounter were pertinent to this visit.    Medical Information   Related to Industrial Injury?   ***   Subjective Complaints:  Patient presents for first  FV today.  DOI: 10/2/2023 chief complaint: Low back pain  Patient reports resolution of low back pain.  She states she is able to move around freely without pain.  She does have a history of low back injuries, however she states the severity of the pain is back to her a regular baseline.  She states she has been very careful with how she lifts and walks.  Rates pain as a 2-3 out of 10.  Denies radiation of pain, weakness, decreased range of motion.   She states she is ready to return to work full duty.  She would like to be discharged from Workmen's Compensation.   Objective Findings: Bilateral lumbar paraspinal muscles are tender to palpation. No midline tenderness. No deformity, dislocation, crepitus, bony step offs. Cervical and thoracic spinous processes are not tender to palpation. No tenderness to palpation of bilateral hips. Full and active ROM. Sensation is intact to light and sharp touch and is symmetric, cap refill is less than 2 seconds, 2+ DP pulses bilaterally. No edema, erythema, fluctuance, warmth. No ecchymosis. No rash. Negative R SLR, negative L SLR.    Pre-Existing Condition(s):     Assessment:   Condition Improved    Status: Discharged /  MMI  Permanent Disability:No     Plan:      Diagnostics:      Comments:       Disability Information   Status: Released to Full Duty    From:  10/13/2023  Through:   Restrictions are:     Physical Restrictions   Sitting:    Standing:    Stooping:    Bending:      Squatting:    Walking:    Climbing:    Pushing:      Pulling:    Other:    Reaching Above Shoulder (L):   Reaching Above Shoulder (R):       Reaching Below Shoulder (L):    Reaching Below Shoulder (R):      Not to exceed Weight Limits   Carrying(hrs):   Weight Limit(lb):   Lifting(hrs):   Weight  Limit(lb):     Comments: D39 updated today  Patient reports resolution of acute low back pain.  She is discharged, MMI, released to full duty    Repetitive Actions   Hands: i.e. Fine Manipulations from Grasping:     Feet: i.e. Operating Foot Controls:     Driving / Operate Machinery:     Health Care Provider’s Original or Electronic Signature  CELE Sanchez Health Care Provider’s Original or Electronic Signature    Torrey Blum DO MPH     Clinic Name / Location: [address] [address] Clinic Phone Number: Dept: [phone]   Appointment Time: 1:00 Pm Visit Start Time: 12:55 PM   Check-In Time:  12:48 Pm Visit Discharge Time:  ***   Original-Treating Physician or Chiropractor    Page 2-Insurer/TPA    Page 3-Employer    Page 4-Employee

## 2023-10-13 NOTE — PROGRESS NOTES
"Subjective:     Jessica Francois is a 43 y.o. female who presents for Work-Related Injury (Back injury now feeling better/Occ pain)      Patient presents for first  FV today.  DOI: 10/2/2023 chief complaint: Low back pain  Patient reports resolution of low back pain.  She states she is able to move around freely without pain.  She does have a history of low back injuries, however she states the severity of the pain is back to her a regular baseline.  She states she has been very careful with how she lifts and walks.  Rates pain as a 2-3 out of 10.  Denies radiation of pain, weakness, decreased range of motion.   She states she is ready to return to work full duty.  She would like to be discharged from Workmen's Compensation.    PMH:   Spinal stenosis; history of lumbar laminectomy for spinal cord decompression with infection  MEDS:  Medications were reviewed in EMR  ALLERGIES:  Allergies were reviewed in EMR  SOCHX:  Works as a   FH:   No pertinent family history to this problem       Objective:     /86   Pulse 77   Temp 36.8 °C (98.3 °F) (Temporal)   Resp 16   Ht 1.753 m (5' 9\")   Wt (!) 137 kg (303 lb)   SpO2 97%   BMI 44.75 kg/m²     Bilateral lumbar paraspinal muscles are tender to palpation. No midline tenderness. No deformity, dislocation, crepitus, bony step offs. Cervical and thoracic spinous processes are not tender to palpation. No tenderness to palpation of bilateral hips. Full and active ROM. Sensation is intact to light and sharp touch and is symmetric, cap refill is less than 2 seconds, 2+ DP pulses bilaterally. No edema, erythema, fluctuance, warmth. No ecchymosis. No rash. Negative R SLR, negative L SLR.     Assessment/Plan:       1. Acute midline low back pain without sciatica    2. Strain of lumbar region, subsequent encounter    Released to Full Duty FROM 10/13/2023     D39 updated today  Patient reports resolution of acute low back pain.  She is discharged, " MMI, released to full duty       Differential diagnosis, natural history, supportive care, and indications for immediate follow-up discussed.

## 2024-01-24 ENCOUNTER — OCCUPATIONAL MEDICINE (OUTPATIENT)
Dept: URGENT CARE | Facility: PHYSICIAN GROUP | Age: 44
End: 2024-01-24
Payer: COMMERCIAL

## 2024-01-24 ENCOUNTER — HOSPITAL ENCOUNTER (OUTPATIENT)
Dept: RADIOLOGY | Facility: MEDICAL CENTER | Age: 44
End: 2024-01-24
Attending: STUDENT IN AN ORGANIZED HEALTH CARE EDUCATION/TRAINING PROGRAM
Payer: COMMERCIAL

## 2024-01-24 VITALS
HEIGHT: 68 IN | TEMPERATURE: 97.2 F | DIASTOLIC BLOOD PRESSURE: 76 MMHG | BODY MASS INDEX: 44.41 KG/M2 | SYSTOLIC BLOOD PRESSURE: 120 MMHG | RESPIRATION RATE: 18 BRPM | HEART RATE: 71 BPM | OXYGEN SATURATION: 97 % | WEIGHT: 293 LBS

## 2024-01-24 DIAGNOSIS — S63.613A SPRAIN OF LEFT MIDDLE FINGER, UNSPECIFIED SITE OF DIGIT, INITIAL ENCOUNTER: ICD-10-CM

## 2024-01-24 DIAGNOSIS — S69.92XA INJURY OF FINGER OF LEFT HAND, INITIAL ENCOUNTER: ICD-10-CM

## 2024-01-24 PROCEDURE — 3074F SYST BP LT 130 MM HG: CPT | Performed by: STUDENT IN AN ORGANIZED HEALTH CARE EDUCATION/TRAINING PROGRAM

## 2024-01-24 PROCEDURE — 3078F DIAST BP <80 MM HG: CPT | Performed by: STUDENT IN AN ORGANIZED HEALTH CARE EDUCATION/TRAINING PROGRAM

## 2024-01-24 PROCEDURE — 99214 OFFICE O/P EST MOD 30 MIN: CPT | Performed by: STUDENT IN AN ORGANIZED HEALTH CARE EDUCATION/TRAINING PROGRAM

## 2024-01-24 PROCEDURE — 73140 X-RAY EXAM OF FINGER(S): CPT | Mod: LT

## 2024-01-24 RX ORDER — TRIAMCINOLONE ACETONIDE 1 MG/G
CREAM TOPICAL
COMMUNITY
Start: 2023-11-30

## 2024-01-24 ASSESSMENT — ENCOUNTER SYMPTOMS
FEVER: 0
SENSORY CHANGE: 0
WEAKNESS: 0
CHILLS: 0

## 2024-01-24 NOTE — LETTER
[facility]  [address]  [place] [address]  Phone:  [phone] - Fax:  [phone]   [facility] Progress Report and Disability Certification  Date of Service: 1/24/2024   No Show:  No  Date / Time of Next Visit: 1/27/2024   Claim Information   Patient Name: Jessica Francois  Claim Number:     Employer:   [employer] Date of Injury: 1/23/2024     Insurer / TPA: Cameron Anton  ID / SSN:     Occupation:     Diagnosis: Diagnoses of Injury of finger of left hand, initial encounter and Sprain of left middle finger, unspecified site of digit, initial encounter were pertinent to this visit.    Medical Information   Related to Industrial Injury?      Subjective Complaints:  DOI: 1/24/24  RONI: Finger jammed/pulled backwards by boxes  INITIAL VISIT (1/24/24): Presents for work related injury.  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  Since injury she has had pain to left middle finger.  Patient has associated swelling and bruising.  Patient reports this decreased range of motion secondary to pain and swelling.  Patient does report sensation numbness/tingling of the tip of her middle finger.  No prior injury.  No second job.   Objective Findings: Left wrist: Normal. Left hand: Swelling left 3rd digit at PIP and tenderness of left 3rd digit) present. Decreased ROM of left 3rd digit with flexion and extension at PIP and DIP joint. Normal capillary refill. Normal pulse.    Pre-Existing Condition(s):     Assessment:   Initial Visit    Status:    Permanent Disability:     Plan:      Diagnostics:      Comments:       Disability Information   Status: Released to Restricted Duty    From:  1/24/2024  Through: 1/27/2024 Restrictions are: Temporary   Physical Restrictions   Sitting:    Standing:    Stooping:    Bending:      Squatting:    Walking:    Climbing:    Pushing:      Pulling:    Other:     Reaching Above Shoulder (L):   Reaching Above Shoulder (R):       Reaching Below Shoulder (L):    Reaching Below Shoulder (R):      Not to exceed Weight Limits   Carrying(hrs):   Weight Limit(lb): < or = to 10 pounds Lifting(hrs):   Weight  Limit(lb): < or = to 10 pounds   Comments:      Repetitive Actions   Hands: i.e. Fine Manipulations from [place] hrs/day  Comments:left hand   Feet: i.e. Operating Foot Controls:     Driving / Operate Machinery:     Health Care Provider’s Original or Electronic Signature  Fabiana Ferrer P.A.-C. Health Care Provider’s Original or Electronic Signature    Torrey Blum DO MPH     Clinic Name / Location: [address] [address] Clinic Phone Number: Dept: [phone]   Appointment Time: 11:20 Am Visit Start Time: 12:45 PM   Check-In Time:  11:51 Am Visit Discharge Time:  2:12 PM   Original-Treating Physician or Chiropractor    Page 2-Insurer/TPA    Page 3-Employer    Page 4-Employee

## 2024-01-24 NOTE — PROGRESS NOTES
"Subjective     Jessica Francois is a 43 y.o. female who presents with Work-Related Injury (New Workers Comp, DOI 01/23/2024, smashed L hand middle finger in between boxes)      DOI: 1/24/24  RONI: Finger jammed/pulled backwards by boxes  INITIAL VISIT (1/24/24): Presents for work related injury.  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  Since injury she has had pain to left middle finger.  Patient has associated swelling and bruising.  Patient reports this decreased range of motion secondary to pain and swelling.  Patient does report sensation numbness/tingling of the tip of her middle finger.  No prior injury.  No second job.     DOI: 1/24/24  RONI: Finger jammed/pulled backwards by boxes  INITIAL VISIT (1/24/24): Presents for work related injury.  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  Since injury she has had pain to left middle finger.  Patient has associated swelling and bruising.  Patient reports this decreased range of motion secondary to pain and swelling.  Patient does report sensation numbness/tingling of the tip of her middle finger.  No prior injury.  No second job.    HPI    Review of Systems   Constitutional:  Negative for chills and fever.   Musculoskeletal:  Positive for joint pain.   Neurological:  Negative for sensory change and weakness.   All other systems reviewed and are negative.             Objective     /76   Pulse 71   Temp 36.2 °C (97.2 °F) (Temporal)   Resp 18   Ht 1.727 m (5' 8\")   Wt (!) 142 kg (312 lb)   SpO2 97%   BMI 47.44 kg/m²      Physical Exam  Constitutional:       Appearance: Normal appearance.   HENT:      Head: Normocephalic and atraumatic.      Nose: Nose normal.   Eyes:      Extraocular Movements: Extraocular movements intact.      Conjunctiva/sclera: Conjunctivae normal.      Pupils: Pupils are equal, round, and reactive to light. "   Cardiovascular:      Rate and Rhythm: Normal rate.   Pulmonary:      Effort: Pulmonary effort is normal.   Musculoskeletal:      Left wrist: Normal.      Left hand: Swelling (left 3rd digit at PIP) and tenderness (left 3rd digit) present. Normal capillary refill. Normal pulse.   Skin:     General: Skin is warm and dry.      Capillary Refill: Capillary refill takes less than 2 seconds.   Neurological:      General: No focal deficit present.      Mental Status: She is alert.         Left wrist: Normal. Left hand: Swelling left 3rd digit at PIP and tenderness of left 3rd digit) present. Decreased ROM of left 3rd digit with flexion and extension at PIP and DIP joint. Normal capillary refill. Normal pulse.           RADIOLOGY RESULTS   DX-FINGER(S) 2+ LEFT    Result Date: 1/24/2024 1/24/2024 1:20 PM HISTORY/REASON FOR EXAM:  Left 3rd digit pain after crush injury at work yesterday. . TECHNIQUE/EXAM DESCRIPTION AND NUMBER OF VIEWS:  3 views of the LEFT fingers. COMPARISON: None FINDINGS: There is mild diffuse swelling of the proximal left 3rd digit. No soft tissue gas or foreign body. No acute displaced fracture or malalignment. Remainder of the left hand is unremarkable.     1.  Mild proximal swelling of the left hand 3rd digit. 2.  No underlying fracture or malalignment.                     Assessment & Plan        1. Injury of finger of left hand, initial encounter  - DX-FINGER(S) 2+ LEFT  - IMPRESSION: 1.  Mild proximal swelling of the left hand 3rd digit. 2.  No underlying fracture or malalignment.    2. Sprain of left middle finger, unspecified site of digit, initial encounter      C4 and D39 completed.  Follow up in 3-5 days.  WORK RESTRICTIONS: No fine hand manipulations of left hand. Weight limit of < 10 lbs.    Differential diagnoses, supportive care measures (rest, ice, elevation, OTC Tylenol) and indications for immediate follow-up discussed with patient. Pathogenesis of diagnosis discussed including  typical length and natural progression.      Instructed to return to urgent care or nearest emergency department if symptoms fail to improve, for any change in condition, further concerns, or new concerning symptoms.    Patient states understanding and agrees with the plan of care and discharge instructions.      My total time spent caring for the patient on the day of the encounter was 30 minutes including obtaining patient history, physical exam, discussing differential diagnosis, plan of care, supportive care measures, appropriate follow-up and indications for immediate follow-up. This does not include time spent on separately billable procedures/tests.
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
## 2024-01-27 ENCOUNTER — OCCUPATIONAL MEDICINE (OUTPATIENT)
Dept: URGENT CARE | Facility: PHYSICIAN GROUP | Age: 44
End: 2024-01-27
Payer: COMMERCIAL

## 2024-01-27 VITALS
HEIGHT: 68 IN | DIASTOLIC BLOOD PRESSURE: 80 MMHG | HEART RATE: 80 BPM | WEIGHT: 293 LBS | BODY MASS INDEX: 44.41 KG/M2 | RESPIRATION RATE: 20 BRPM | OXYGEN SATURATION: 98 % | TEMPERATURE: 97.9 F | SYSTOLIC BLOOD PRESSURE: 124 MMHG

## 2024-01-27 DIAGNOSIS — S63.613D: ICD-10-CM

## 2024-01-27 PROCEDURE — 3074F SYST BP LT 130 MM HG: CPT

## 2024-01-27 PROCEDURE — 99213 OFFICE O/P EST LOW 20 MIN: CPT

## 2024-01-27 PROCEDURE — 3079F DIAST BP 80-89 MM HG: CPT

## 2024-01-27 NOTE — LETTER
"   [facility] [place]  [place] [address]  Phone:  [phone] - Fax:  [phone]   [facility] Progress Report and Disability Certification  Date of Service: 1/27/2024   No Show:  No  Date / Time of Next Visit:     Claim Information   Patient Name: Jessica Francois  Claim Number:     Employer:   [employer] Date of Injury: 1/23/2024     Insurer / TPA: Cameron Anton  ID / SSN:     Occupation:   Diagnosis: The encounter diagnosis was Sprain of left middle finger, subsequent encounter.    Medical Information   Related to Industrial Injury? Yes    Subjective Complaints:    Hand Injury  This is a new problem. The current episode started in the past 7 days. The problem has been gradually improving. The symptoms are aggravated by exertion. She has tried rest and ice for the symptoms. The treatment provided moderate relief.     DOI: 1/24/24  RONI:   Patient was shifting boxes at work when her left middle finger got jammed after a coworker placed a box on the machine without looking.      This is patient's second visit for a work injury that occurred on 1/24/2024.  She sustained a strain on the left middle finger. Pain and swelling has been better. Range of motion has been better as well. Improvement in pan and function is at least 50% better.  She is icing the area and resting, providing relief.  She denies any new o   Objective Findings: /80   Pulse 80   Temp 36.6 °C (97.9 °F) (Temporal)   Resp 20   Ht 1.727 m (5' 8\")   Wt (!) 142 kg (314 lb)   SpO2 98%   BMI 47.74 kg/m²      Physical Exam  Constitutional:       Appearance: Normal appearance.   HENT:      Head: Normocephalic.   Eyes:      Extraocular Movements: Extraocular movements intact.   Cardiovascular:      Rate and Rhythm: Normal rate.      Pulses: Normal pulses.   Pulmonary:      Effort: Pulmonary effort is normal.   Musculoskeletal:         General: Normal range of " motion.      Left hand: Tenderness present. Normal range of motion.      Cervical back: Normal range of motion.      Comments: Mild swelling and tenderness left middle finger mcp   Skin:     General: Skin is warm and dry.   Neurological:      General: No focal deficit present.      Mental Status: She is alert.   Psychiatric:         Mood and Affect: Mood normal.         Behavior: Behavior normal.         Judgment: Judgment normal.      Pre-Existing Condition(s):     Assessment:   Condition Improved    Status: Discharged /  MMI  Permanent Disability:No    Plan:      Diagnostics:      Comments:       Disability Information   Status: Released to Full Duty    From:     Through:   Restrictions are:     Physical Restrictions   Sitting:    Standing:    Stooping:    Bending:      Squatting:    Walking:    Climbing:    Pushing:      Pulling:    Other:    Reaching Above Shoulder (L):   Reaching Above Shoulder (R):       Reaching Below Shoulder (L):    Reaching Below Shoulder (R):      Not to exceed Weight Limits   Carrying(hrs):   Weight Limit(lb):   Lifting(hrs):   Weight  Limit(lb):     Comments: Patient doing well, with significant improvement in pain and function.  She can be discharged to full duty with no restrictions.  Instructed to return to clinic with worsening or persistent pain and swelling. Recommended RICE (rest, ice, compression, elevation).  May take acetaminophen as needed for pain relief.  Advised to apply ice to the area.  May apply topical analgesics or patches for additional pain relief.       Repetitive Actions   Hands: i.e. Fine Manipulations from Grasping:     Feet: i.e. Operating Foot Controls:     Driving / Operate Machinery:     Health Care Provider’s Original or Electronic Signature  CELE Lafleur Health Care Provider’s Original or Electronic Signature    Torrey Blum DO MPH     Clinic Name / Location: [facility] [address] [address] Clinic Phone  Number: Dept: [phone]   Appointment Time: 9:00 Am Visit Start Time: 9:06 AM   Check-In Time:  8:40 Am Visit Discharge Time:  9: 40 AM   Original-Treating Physician or Chiropractor    Page 2-Insurer/TPA    Page 3-Employer    Page 4-Employee

## 2024-01-27 NOTE — PROGRESS NOTES
Subjective     Jessica Francois is a 43 y.o. female who presents with Follow-Up (W/c DOI 1.22.2024/Right middle finger)            Hand Injury  This is a new problem. The current episode started in the past 7 days. The problem has been gradually improving. The symptoms are aggravated by exertion. She has tried rest and ice for the symptoms. The treatment provided moderate relief.     DOI: 1/24/24  RONI:   Patient was shifting boxes at work when her left middle finger got jammed after a coworker placed a box on the machine without looking.      This is patient's second visit for a work injury that occurred on 1/24/2024.  She sustained a strain on the left middle finger. Pain and swelling has been better. Range of motion has been better as well. Improvement in pan and function is at least 50% better.  She is icing the area and resting, providing relief.  She denies any new or worsening pain.        Patient's current problem list, medications, and past medical/surgical history were reviewed in Epic.    PMH:  has no past medical history on file.  MEDS:   Current Outpatient Medications:     triamcinolone acetonide (KENALOG) 0.1 % Cream, PLEASE SEE ATTACHED FOR DETAILED DIRECTIONS, Disp: , Rfl:     varenicline (CHANTIX) 1 MG tablet, Take 1 mg by mouth 2 times a day. FOR 30 DAYS, Disp: , Rfl:   ALLERGIES:   Allergies   Allergen Reactions    Nsaids Rash and Vomiting    Pcn [Penicillins] Rash and Itching     Head to toe, severe itching    Soma [Carisoprodol] Vomiting    Tape Rash     Paper tape and tegaderm ok to use    Zosyn Rash     Pt doesn't recognize this drug     SURGHX:   Past Surgical History:   Procedure Laterality Date    MA CYSTOURETHROSCOPY N/A 3/30/2023    Procedure: CYSTOSCOPY;  Surgeon: Cheri Max M.D.;  Location: SURGERY SAME DAY [facility];  Service: Gynecology    VAGINAL HYSTERECTOMY SCOPE TOTAL N/A 3/30/2023    Procedure: LAPARASCOPIC ASSISTED VAGINAL HYSTERECTOMY, BILATERAL SALPINGECTOMY, LEFT  "SALPINGO-OOPHORECTOMY;  Surgeon: Cheri Max M.D.;  Location: SURGERY SAME DAY [facility];  Service: Gynecology    KACEY BY LAPAROSCOPY  07/16/2019    Procedure: CHOLECYSTECTOMY, LAPAROSCOPIC;  Surgeon: Mansoor Campa M.D.;  Location: SURGERY [facility];  Service: General    FUSION, SPINE, LUMBAR, PLIF  07/15/2013    Performed by Edwin Pichardo M.D. at SURGERY [facility]    LUMBAR DECOMPRESSION  07/15/2013    Performed by Edwin Pichardo M.D. at SURGERY [facility]    DILATION AND EVACUATION  06/08/2012    Performed by DAVID PABON at SURGERY SAME DAY [facility]    LUMBAR LAMINECTOMY DISKECTOMY  10/23/2009    Performed by KRISTYN BERNARD at SURGERY [facility]    OTHER  01/01/1995    wisdom teeth removed.    OTHER  01/01/1985    tonsillectomy    OTHER ORTHOPEDIC SURGERY  2009/1997    micro discectomy times 2 1997/2009     SOCHX:  reports that she has quit smoking. Her smoking use included cigarettes. She has a 2.4 pack-year smoking history. She has never used smokeless tobacco. She reports current alcohol use. She reports current drug use.  FH: Reviewed with patient, not pertinent to this visit.       Review of Systems   All other systems reviewed and are negative.             Objective     /80   Pulse 80   Temp 36.6 °C (97.9 °F) (Temporal)   Resp 20   Ht 1.727 m (5' 8\")   Wt (!) 142 kg (314 lb)   SpO2 98%   BMI 47.74 kg/m²      Physical Exam  Constitutional:       Appearance: Normal appearance.   HENT:      Head: Normocephalic.   Eyes:      Extraocular Movements: Extraocular movements intact.   Cardiovascular:      Rate and Rhythm: Normal rate.      Pulses: Normal pulses.   Pulmonary:      Effort: Pulmonary effort is normal.   Musculoskeletal:         General: Normal range of motion.      Left hand: Tenderness present. Normal range of motion.      Cervical back: Normal range of motion.      Comments: Mild swelling and tenderness left middle finger mcp   Skin:     " General: Skin is warm and dry.   Neurological:      General: No focal deficit present.      Mental Status: She is alert.   Psychiatric:         Mood and Affect: Mood normal.         Behavior: Behavior normal.         Judgment: Judgment normal.               Assessment & Plan       1. Sprain of left middle finger, subsequent encounter         Patient doing well, with significant improvement in pain and function.  This is her second visit for the said injury that happened on 1/24/2024.  She can be discharged to full duty with no restrictions.  Instructed to return to clinic with worsening or persistent pain and swelling. Recommended RICE (rest, ice, compression, elevation).  May take acetaminophen as needed for pain relief.  Advised to apply ice to the area.  May apply topical analgesics or patches for additional pain relief.   Discussed treatment plan with patient, she is agreeable and verbalized understanding.  Educated patient on signs and symptoms watch out for, when to return to the clinic or go to the ER.    D39 paperwork completed.    Electronically Signed by JUSTYN Hauser

## 2025-01-28 NOTE — CARE PLAN
Patient is calling regarding her OB check currently scheduled for tomorrow, 1/29, at 1pm. Patient states she needs to reschedule to a different time, or a different date, if necessary.    Please advise patient at [phone] to reschedule patient's OB check.    Problem: Discharge Barriers/Planning  Goal: Patient's continuum of care needs will be met  Outcome: PROGRESSING AS EXPECTED    Admitted for RUQ pain, s/p Lap Shara.    Seen pt, AOx 4. Pain on her abdomen 5-7, comfortable enough. Denies nausea, tolerating PO, diet advanced. X4 abdominal lap sites, CDI. Fluids on 0.45 NS w 20 KCl, at 100 ml/hr. Plan of care discussed includes Safety, pain management, IV abx and pt understands.

## (undated) DEVICE — SUTURE 0 COATED VICRYL 6-18IN - (12PK/BX)

## (undated) DEVICE — NEPTUNE 4 PORT MANIFOLD - (20/PK)

## (undated) DEVICE — BLADE SURGICAL CLIPPER - (50EA/CA)

## (undated) DEVICE — GOWN SURGEONS LARGE - (32/CA)

## (undated) DEVICE — PAD BABY LAP 4X18 W/O - RINGS PREWASHED 5/PK 40PK/CS

## (undated) DEVICE — SUTURE 2-0 VICRYL PLUS CT-2 - 27 INCH (36/BX)

## (undated) DEVICE — GOWN WARMING STANDARD FLEX - (30/CA)

## (undated) DEVICE — NEEDLE INSFL 120MM 14GA VRRS - (20/BX)

## (undated) DEVICE — GLOVE BIOGEL PI INDICATOR SZ 6.5 SURGICAL PF LF - (50/BX 4BX/CA)

## (undated) DEVICE — BAG RETRIEVAL 10ML (10EA/BX)

## (undated) DEVICE — SET SUCTION/IRRIGATION WITH DISPOSABLE TIP (6/CA )PART #0250-070-520 IS A SUB

## (undated) DEVICE — BLADE SURGICAL #10 - (50/BX)

## (undated) DEVICE — SET LEADWIRE 5 LEAD BEDSIDE DISPOSABLE ECG (1SET OF 5/EA)

## (undated) DEVICE — SUCTION INSTRUMENT YANKAUER BULBOUS TIP W/O VENT (50EA/CA)

## (undated) DEVICE — TOWEL STOP TIMEOUT SAFETY FLAG (40EA/CA)

## (undated) DEVICE — KIT ANESTHESIA W/CIRCUIT & 3/LT BAG W/FILTER (20EA/CA)

## (undated) DEVICE — SET IRRIGATION CYSTOSCOPY TUBE L80 IN (20EA/CA)

## (undated) DEVICE — ELECTRODE 850 FOAM ADHESIVE - HYDROGEL RADIOTRNSPRNT (50/PK)

## (undated) DEVICE — TROCAR Z THREAD 11 X 100 - BLADED (6/BX)

## (undated) DEVICE — HEAD HOLDER JUNIOR/ADULT

## (undated) DEVICE — SYRINGE 3 CC 21 GA X 1-1/2 - NDL SAFETY (50/BX 8BX/CA)

## (undated) DEVICE — DRAPESURG STERI-DRAPE LONG - (10/BX 4BX/CA)

## (undated) DEVICE — UTERINE MANIP RUMI 6.7X10 - (5/BX)

## (undated) DEVICE — TUBE E-T HI-LO CUFF 7.0MM (10EA/PK)

## (undated) DEVICE — SLEEVE VASO CALF MED - (10PR/CA)

## (undated) DEVICE — SUTURE 4-0 VICRYL PLUS SH - UNDYED 27 INCH (36PK/BX)

## (undated) DEVICE — MASK ANESTHESIA ADULT  - (100/CA)

## (undated) DEVICE — PACK LAP CHOLE OR - (2EA/CA)

## (undated) DEVICE — CLIP MED LG INTNL HRZN TI ESCP - (20/BX)

## (undated) DEVICE — CHLORAPREP 26 ML APPLICATOR - ORANGE TINT(25/CA)

## (undated) DEVICE — SENSOR SPO2 NEO LNCS ADHESIVE (20/BX) SEE USER NOTES

## (undated) DEVICE — KIT  I.V. START (100EA/CA)

## (undated) DEVICE — MAT PATIENT POSITIONING PREVALON (10EA/CA)

## (undated) DEVICE — TUBING CLEARLINK DUO-VENT - C-FLO (48EA/CA)

## (undated) DEVICE — SUTURE 0 VICRYL PLUS CT-2 - 27 INCH (36/BX)

## (undated) DEVICE — SUTURE 0 VICRYL PLUS UR-6 - 27 INCH (36/BX)

## (undated) DEVICE — ANTI-FOG SOLUTION - 60BTL/CA

## (undated) DEVICE — SPONGE GAUZESTER. 2X2 4-PL - (2/PK 50PK/BX 30BX/CS)

## (undated) DEVICE — SYRINGE 30 ML LL (56/BX)

## (undated) DEVICE — GLOVE, BIOGEL ECLIPSE, SZ 7.0, PF LTX (50/BX)

## (undated) DEVICE — ELECTRODE DUAL RETURN W/ CORD - (50/PK)

## (undated) DEVICE — SUTURE 4-0 VICRYL PLUS FS-2 - 27 INCH (36/BX)

## (undated) DEVICE — GLOVE BIOGEL ECLIPSE PF LATEX SIZE 8.0  (50PR/BX)

## (undated) DEVICE — SENSOR OXIMETER ADULT SPO2 RD SET (20EA/BX)

## (undated) DEVICE — GLOVE SZ 6 BIOGEL PI MICRO - PF LF (50PR/BX 4BX/CA)

## (undated) DEVICE — GLOVE BIOGEL PI INDICATOR SZ 8.0 SURGICAL PF LF -(50/BX 4BX/CA)

## (undated) DEVICE — GLOVE BIOGEL INDICATOR SZ 7SURGICAL PF LTX - (50/BX 4BX/CA)

## (undated) DEVICE — GLOVE SZ 7.5 BIOGEL PI MICRO - PF LF (50PR/BX)

## (undated) DEVICE — GLOVE SZ 6.5 BIOGEL PI MICRO - PF LF (50PR/BX)

## (undated) DEVICE — CANISTER SUCTION 3000ML MECHANICAL FILTER AUTO SHUTOFF MEDI-VAC NONSTERILE LF DISP  (40EA/CA)

## (undated) DEVICE — APPLIER ENDOCLIP 5MM - (6EA/CA)

## (undated) DEVICE — TUBE CONNECT SUCTION CLEAR 120 X 1/4" (50EA/CA)"

## (undated) DEVICE — TROCAR Z THREAD12MM OPTICAL - NON BLADED (6/BX)

## (undated) DEVICE — SYSTEM CLEARIFY VISUALIZATION (10EA/PK)

## (undated) DEVICE — BANDAID SHEER STRIP 3/4 IN (100EA/BX 12BX/CA)

## (undated) DEVICE — SYRINGE SAFETY 10 ML 18 GA X 1 1/2 BLUNT LL (100/BX 4BX/CA)

## (undated) DEVICE — DRAPE VAGINAL BIB W/ POUCH (10EA/CA)

## (undated) DEVICE — DRESSING TRANSPARENT FILM TEGADERM 2.375 X 2.75"  (100EA/BX)"

## (undated) DEVICE — Device

## (undated) DEVICE — LACTATED RINGERS INJ 1000 ML - (14EA/CA 60CA/PF)

## (undated) DEVICE — GVL 4 STAT DISPOSABLE - (10/BX)

## (undated) DEVICE — GLOVE BIOGEL PI INDICATOR SZ 7.0 SURGICAL PF LF - (50/BX 4BX/CA)

## (undated) DEVICE — BANDAID X-LARGE 2 X 4 IN LF (50EA/BX)

## (undated) DEVICE — GLOVE BIOGEL INDICATOR SZ 8 SURGICAL PF LTX - (50/BX 4BX/CA)

## (undated) DEVICE — GLOVE BIOGEL INDICATOR SZ 7.5 SURGICAL PF LTX - (50PR/BX 4BX/CA)

## (undated) DEVICE — SET EXTENSION WITH 2 PORTS (48EA/CA) ***PART #2C8610 IS A SUBSTITUTE*****

## (undated) DEVICE — MAT PATIENT POSITIONING PREVALON

## (undated) DEVICE — PAD SANITARY 11IN MAXI IND WRAPPED  (12EA/PK 24PK/CA)

## (undated) DEVICE — GLOVE BIOGEL SZ 8 SURGICAL PF LTX - (50PR/BX 4BX/CA)

## (undated) DEVICE — SUTURE 0 VICRYL PLUS CT-1 - 36 INCH (36/BX)

## (undated) DEVICE — CLOSURE SKIN STRIP 1/2 X 4 IN - (STERI STRIP) (50/BX 4BX/CA)

## (undated) DEVICE — TUBE CONNECTING SUCTION - CLEAR PLASTIC STERILE 72 IN (50EA/CA)

## (undated) DEVICE — LIGASURE 5MM BLUNT TIP LONG - 44CM (6EA/PK)

## (undated) DEVICE — MASK OXYGEN VNYL ADLT MED CONC WITH 7 FOOT TUBING  - (50EA/CA)

## (undated) DEVICE — CANISTER SUCTION RIGID RED 1500CC (40EA/CA)

## (undated) DEVICE — TROCAR 5X100 BLADED ADVANCE - FIXATION (6/BX)

## (undated) DEVICE — CANNULA W/SEAL11X100ZTHREAD - (12/BX)

## (undated) DEVICE — TROCAR 5X100 BLADED Z-THREAD - KII (6/BX)

## (undated) DEVICE — GLOVE BIOGEL SZ 6.5 SURGICAL PF LTX (50PR/BX 4BX/CA)

## (undated) DEVICE — TUBING SETDISPOS HIGH FLOW II - (10/BX)

## (undated) DEVICE — KIT ROOM DECONTAMINATION

## (undated) DEVICE — ARMREST CRADLE FOAM - (2PR/PK 12PR/CA)

## (undated) DEVICE — WATER IRRIGATION STERILE 1000ML (12EA/CA)

## (undated) DEVICE — HEMOSTAT ABSORBABLE POWDER SURGICEL 3G (5EA/BX)

## (undated) DEVICE — SUTURE GENERAL

## (undated) DEVICE — CANNULA W/SEAL 5X100 Z-THRE - ADED KII (12/BX)

## (undated) DEVICE — SODIUM CHL IRRIGATION 0.9% 1000ML (12EA/CA)

## (undated) DEVICE — APPLICATOR ENDOSCOPIC SURGICEL (5EA/BX)